# Patient Record
Sex: FEMALE | Race: ASIAN | NOT HISPANIC OR LATINO | Employment: FULL TIME | URBAN - NONMETROPOLITAN AREA
[De-identification: names, ages, dates, MRNs, and addresses within clinical notes are randomized per-mention and may not be internally consistent; named-entity substitution may affect disease eponyms.]

---

## 2017-07-21 ENCOUNTER — HOSPITAL ENCOUNTER (OUTPATIENT)
Dept: FAMILY MEDICINE CLINIC | Facility: CLINIC | Age: 57
Setting detail: SPECIMEN
Discharge: HOME | End: 2017-07-21
Attending: NURSE PRACTITIONER | Admitting: NURSE PRACTITIONER

## 2017-07-21 LAB
T3FREE SERPL-MCNC: 3.35 PG/ML (ref 2.39–6.79)
T4 FREE SERPL-MCNC: 0.92 NG/DL (ref 0.58–1.64)
TSH SERPL-ACNC: 1.54 UIU/ML (ref 0.34–5.6)

## 2017-07-22 ENCOUNTER — HOSPITAL ENCOUNTER (OUTPATIENT)
Dept: ULTRASOUND IMAGING | Facility: HOSPITAL | Age: 57
Discharge: HOME | End: 2017-07-22
Attending: NURSE PRACTITIONER | Admitting: NURSE PRACTITIONER

## 2017-08-07 ENCOUNTER — HOSPITAL ENCOUNTER (OUTPATIENT)
Dept: GENERAL RADIOLOGY | Facility: HOSPITAL | Age: 57
Discharge: HOME | End: 2017-08-07
Attending: INTERNAL MEDICINE | Admitting: INTERNAL MEDICINE

## 2017-08-25 ENCOUNTER — HOSPITAL ENCOUNTER (OUTPATIENT)
Dept: FAMILY MEDICINE CLINIC | Facility: CLINIC | Age: 57
Setting detail: SPECIMEN
Discharge: HOME | End: 2017-08-25
Attending: NURSE PRACTITIONER | Admitting: NURSE PRACTITIONER

## 2017-08-25 LAB
ALBUMIN SERPL-MCNC: 4.3 G/DL (ref 3.5–4.8)
ALBUMIN/GLOB SERPL: 1.1 {RATIO} (ref 1–1.7)
ALP SERPL-CCNC: 77 IU/L (ref 32–91)
ALT SERPL-CCNC: 16 IU/L (ref 14–54)
ANION GAP SERPL CALC-SCNC: 12.6 MMOL/L (ref 10–20)
AST SERPL-CCNC: 21 IU/L (ref 15–41)
BILIRUB SERPL-MCNC: 0.6 MG/DL (ref 0.3–1.2)
BUN SERPL-MCNC: 12 MG/DL (ref 8–20)
BUN/CREAT SERPL: 17.1 (ref 5.4–26.2)
CALCIUM SERPL-MCNC: 9.9 MG/DL (ref 8.9–10.3)
CHLORIDE SERPL-SCNC: 102 MMOL/L (ref 101–111)
CHOLEST SERPL-MCNC: 251 MG/DL
CHOLEST/HDLC SERPL: 5.3 {RATIO}
CONV CO2: 29 MMOL/L (ref 22–32)
CONV LDL CHOLESTEROL DIRECT: 156 MG/DL (ref 0–100)
CONV TOTAL PROTEIN: 8.2 G/DL (ref 6.1–7.9)
CREAT UR-MCNC: 0.7 MG/DL (ref 0.4–1)
FOLATE SERPL-MCNC: >24.8 NG/ML (ref 5.9–24.8)
GLOBULIN UR ELPH-MCNC: 3.9 G/DL (ref 2.5–3.8)
GLUCOSE SERPL-MCNC: 88 MG/DL (ref 65–99)
HDLC SERPL-MCNC: 48 MG/DL
LDLC/HDLC SERPL: 3.3 {RATIO}
LIPID INTERPRETATION: ABNORMAL
MAGNESIUM SERPL-MCNC: 2.3 MG/DL (ref 1.8–2.5)
PHOSPHATE SERPL-MCNC: 4.4 MG/DL (ref 2.4–4.7)
POTASSIUM SERPL-SCNC: 3.6 MMOL/L (ref 3.6–5.1)
SODIUM SERPL-SCNC: 140 MMOL/L (ref 136–144)
TRIGL SERPL-MCNC: 214 MG/DL
VIT B12 SERPL-MCNC: 286 PG/ML (ref 180–914)
VLDLC SERPL CALC-MCNC: 47.4 MG/DL

## 2017-12-06 ENCOUNTER — OFFICE VISIT (OUTPATIENT)
Dept: CARDIOLOGY | Facility: CLINIC | Age: 57
End: 2017-12-06

## 2017-12-06 VITALS
SYSTOLIC BLOOD PRESSURE: 130 MMHG | DIASTOLIC BLOOD PRESSURE: 90 MMHG | HEART RATE: 62 BPM | HEIGHT: 60 IN | WEIGHT: 152 LBS | BODY MASS INDEX: 29.84 KG/M2

## 2017-12-06 DIAGNOSIS — R00.1 BRADYCARDIA: Primary | ICD-10-CM

## 2017-12-06 PROCEDURE — 93000 ELECTROCARDIOGRAM COMPLETE: CPT | Performed by: INTERNAL MEDICINE

## 2017-12-06 PROCEDURE — 99245 OFF/OP CONSLTJ NEW/EST HI 55: CPT | Performed by: INTERNAL MEDICINE

## 2017-12-06 RX ORDER — VITAMIN B COMPLEX
1 CAPSULE ORAL DAILY
COMMUNITY

## 2017-12-06 NOTE — PROGRESS NOTES
Subjective:     Encounter Date:12/06/2017      Patient ID: Tiny Avina is a 57 y.o. female.  Dear Dr. Frias thank you for referring this patient to my office for a second opinion of symptomatic bradycardia and need for a pacemaker.  Chief Complaint:Symptomatic bradycardia.  History of Present Illness    57-year-old female who presents today for a second opinion.  Patient was having episodes of feeling very poorly.  She would get chest tightness and feels very bad.  She would check her pulse and her heart rate would decrease significantly.  She underwent a Zio patch that was placed by a cardiologist in [place].  Her desire patch actually was available to me and she did have some pauses with a ventricular escape rhythm.  It was recommended that she have a pacemaker placement.  She came to my office today for a second opinion.    Review of Systems   Musculoskeletal: Positive for joint pain.   All other systems reviewed and are negative.        ECG 12 Lead  Date/Time: 12/6/2017 10:34 AM  Performed by: VELMA MANCERA  Authorized by: VELMA MANCERA   Comparison: compared with previous ECG from 12/4/2017  Similar to previous ECG  Rhythm: sinus rhythm  Clinical impression: normal ECG               Objective:     Physical Exam   Constitutional: She is oriented to person, place, and time. She appears well-developed.   HENT:   Head: Normocephalic.   Eyes: Conjunctivae are normal.   Neck: Normal range of motion.   Cardiovascular: Normal rate, regular rhythm and normal heart sounds.    Pulmonary/Chest: Breath sounds normal.   Abdominal: Soft. Bowel sounds are normal.   Musculoskeletal: Normal range of motion. She exhibits no edema.   Neurological: She is alert and oriented to person, place, and time.   Skin: Skin is warm and dry.   Psychiatric: She has a normal mood and affect. Her behavior is normal.   Vitals reviewed.      Lab Review:       Assessment:          Diagnosis Plan   1. Bradycardia             Plan:       1.  57-year-old female who presents today for a second opinion of what appears to be symptomatic bradycardia.  I did review the 14 day monitor personally.  I had a very extensive discussion with the patient based on recommendations by another cardiologist that she needed a pacemaker placed.  Interestingly she's never had issues until she had a thyroid biopsy.  It was after the thyroid biopsy that she started having these episodes of feeling her heart slowed down and feeling very bad with it.  She has been worked up extensively I reviewed all those records and could not find anything either that was remarkable.  I told her that based on the information at the time by the other cardiologist it was clear that she was having symptomatic bradycardia and that is a good indication for a pacemaker.  However in the past several weeks her symptoms have abated and she is doing significantly better.  She says she still gets them a little bit but nothing like they were and are only lasting a very brief amount of time.  Although it would be strange it could be from her biopsy of her thyroid that could've activated a vagus nerve causing some of these issues.  Or some other unknown nerve that could've contributed.  At this point since her symptoms are resolving I would follow her clinically and not place a pacemaker.  I told to follow-up in 3 months for reassessment.  I told her if her symptoms worsen or recur or she has a near syncopal episode and she would need a pacemaker placed.  2.  Palpitations she's had these for a long time and haven't really changed much.  The 14 day monitor did show evidence of tachybradycardia syndrome also.  However since her symptoms are improving again will follow clinically and not rush to put a pacemaker in at this point.        Total time spent 80 minutes reviewing records and discussion with 40 minutes face-to-face contact with patient

## 2019-06-26 ENCOUNTER — OFFICE VISIT (OUTPATIENT)
Dept: FAMILY MEDICINE CLINIC | Facility: CLINIC | Age: 59
End: 2019-06-26

## 2019-06-26 VITALS
OXYGEN SATURATION: 98 % | DIASTOLIC BLOOD PRESSURE: 76 MMHG | WEIGHT: 154 LBS | HEART RATE: 52 BPM | BODY MASS INDEX: 30.23 KG/M2 | SYSTOLIC BLOOD PRESSURE: 122 MMHG | HEIGHT: 60 IN | RESPIRATION RATE: 16 BRPM

## 2019-06-26 DIAGNOSIS — Z00.00 LABORATORY EXAM ORDERED AS PART OF ROUTINE GENERAL MEDICAL EXAMINATION: ICD-10-CM

## 2019-06-26 DIAGNOSIS — M54.32 SCIATICA OF LEFT SIDE: Primary | ICD-10-CM

## 2019-06-26 PROCEDURE — 99203 OFFICE O/P NEW LOW 30 MIN: CPT | Performed by: NURSE PRACTITIONER

## 2019-06-26 NOTE — PATIENT INSTRUCTIONS
Sciatica Rehab  Ask your health care provider which exercises are safe for you. Do exercises exactly as told by your health care provider and adjust them as directed. It is normal to feel mild stretching, pulling, tightness, or discomfort as you do these exercises, but you should stop right away if you feel sudden pain or your pain gets worse. Do not begin these exercises until told by your health care provider.  Stretching and range of motion exercises  These exercises warm up your muscles and joints and improve the movement and flexibility of your hips and your back. These exercises also help to relieve pain, numbness, and tingling.  Exercise A: Sciatic nerve glide  1. Sit in a chair with your head facing down toward your chest. Place your hands behind your back. Let your shoulders slump forward.  2. Slowly straighten one of your knees while you tilt your head back as if you are looking toward the ceiling. Only straighten your leg as far as you can without making your symptoms worse.  3. Hold for __________ seconds.  4. Slowly return to the starting position.  5. Repeat with your other leg.  Repeat __________ times. Complete this exercise __________ times a day.  Exercise B: Knee to chest with hip adduction and internal rotation    1. Lie on your back on a firm surface with both legs straight.  2. Bend one of your knees and move it up toward your chest until you feel a gentle stretch in your lower back and buttock. Then, move your knee toward the shoulder that is on the opposite side from your leg.  ? Hold your leg in this position by holding onto the front of your knee.  3. Hold for __________ seconds.  4. Slowly return to the starting position.  5. Repeat with your other leg.  Repeat __________ times. Complete this exercise __________ times a day.  Exercise C: Prone extension on elbows    1. Lie on your abdomen on a firm surface. A bed may be too soft for this exercise.  2. Prop yourself up on your  elbows.  3. Use your arms to help lift your chest up until you feel a gentle stretch in your abdomen and your lower back.  ? This will place some of your body weight on your elbows. If this is uncomfortable, try stacking pillows under your chest.  ? Your hips should stay down, against the surface that you are lying on. Keep your hip and back muscles relaxed.  4. Hold for __________ seconds.  5. Slowly relax your upper body and return to the starting position.  Repeat __________ times. Complete this exercise __________ times a day.  Strengthening exercises  These exercises build strength and endurance in your back. Endurance is the ability to use your muscles for a long time, even after they get tired.  Exercise D: Pelvic tilt  1. Lie on your back on a firm surface. Bend your knees and keep your feet flat.  2. Tense your abdominal muscles. Tip your pelvis up toward the ceiling and flatten your lower back into the floor.  ? To help with this exercise, you may place a small towel under your lower back and try to push your back into the towel.  3. Hold for __________ seconds.  4. Let your muscles relax completely before you repeat this exercise.  Repeat __________ times. Complete this exercise __________ times a day.  Exercise E: Alternating arm and leg raises    1. Get on your hands and knees on a firm surface. If you are on a hard floor, you may want to use padding to cushion your knees, such as an exercise mat.  2. Line up your arms and legs. Your hands should be below your shoulders, and your knees should be below your hips.  3. Lift your left leg behind you. At the same time, raise your right arm and straighten it in front of you.  ? Do not lift your leg higher than your hip.  ? Do not lift your arm higher than your shoulder.  ? Keep your abdominal and back muscles tight.  ? Keep your hips facing the ground.  ? Do not arch your back.  ? Keep your balance carefully, and do not hold your breath.  4. Hold for  __________ seconds.  5. Slowly return to the starting position and repeat with your right leg and your left arm.  Repeat __________ times. Complete this exercise __________ times a day.  Posture and body mechanics    Body mechanics refers to the movements and positions of your body while you do your daily activities. Posture is part of body mechanics. Good posture and healthy body mechanics can help to relieve stress in your body's tissues and joints. Good posture means that your spine is in its natural S-curve position (your spine is neutral), your shoulders are pulled back slightly, and your head is not tipped forward. The following are general guidelines for applying improved posture and body mechanics to your everyday activities.  Standing    · When standing, keep your spine neutral and your feet about hip-width apart. Keep a slight bend in your knees. Your ears, shoulders, and hips should line up.  · When you do a task in which you  one place for a long time, place one foot up on a stable object that is 2-4 inches (5-10 cm) high, such as a footstool. This helps keep your spine neutral.  Sitting    · When sitting, keep your spine neutral and keep your feet flat on the floor. Use a footrest, if necessary, and keep your thighs parallel to the floor. Avoid rounding your shoulders, and avoid tilting your head forward.  · When working at a desk or a computer, keep your desk at a height where your hands are slightly lower than your elbows. Slide your chair under your desk so you are close enough to maintain good posture.  · When working at a computer, place your monitor at a height where you are looking straight ahead and you do not have to tilt your head forward or downward to look at the screen.  Resting    · When lying down and resting, avoid positions that are most painful for you.  · If you have pain with activities such as sitting, bending, stooping, or squatting (flexion-based activities), lie in a  position in which your body does not bend very much. For example, avoid curling up on your side with your arms and knees near your chest (fetal position).  · If you have pain with activities such as standing for a long time or reaching with your arms (extension-based activities), lie with your spine in a neutral position and bend your knees slightly. Try the following positions:  ? Lying on your side with a pillow between your knees.  ? Lying on your back with a pillow under your knees.  Lifting    · When lifting objects, keep your feet at least shoulder-width apart and tighten your abdominal muscles.  · Bend your knees and hips and keep your spine neutral. It is important to lift using the strength of your legs, not your back. Do not lock your knees straight out.  · Always ask for help to lift heavy or awkward objects.  This information is not intended to replace advice given to you by your health care provider. Make sure you discuss any questions you have with your health care provider.  Document Released: 12/18/2006 Document Revised: 08/24/2017 Document Reviewed: 09/02/2016  [employer] Interactive Patient Education © 2019 [employer]

## 2019-06-26 NOTE — PROGRESS NOTES
Subjective   Tiny Avina is a 59 y.o. female.     History of Present Illness   Tiny Avina 59 y.o. female who presents today for a new patient appointment.    she has a history of   Patient Active Problem List   Diagnosis   • Bradycardia   .  she is here to establish care I reviewed the PFSH recorded today by my MA/LPN staff.   she   She has been feeling well except for intermittent sciatica on left side.  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  Pain improves when up walking or lying on right side.  Has tried biofreeze with some improvement.  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          Sees GYN in [place].  Had PAP and mammogram earlier this year and was normal.      Patient had Dexa scan a few years ago and had osteopenia. Takes calcium and vitamin D.        Patient had colonoscopy 4-5 years ago. Scope was normal but to have repeat in 5 years due to father and sister with colorectal cancer.     Has not had labs in a couple of years.  She reports mildly elevated cholesterol.     Has hx of thyroid nodule with negative biopsy.   The following portions of the patient's history were reviewed and updated as appropriate: allergies, current medications, past family history, past medical history, past social history, past surgical history and problem list.    Review of Systems   Constitutional: Negative for unexpected weight change.   Respiratory: Negative for shortness of breath.    Cardiovascular: Negative for chest pain and palpitations.   Musculoskeletal: Positive for back pain. Negative for gait problem.   Psychiatric/Behavioral: Negative for behavioral problems.       Objective   Physical Exam   Constitutional: She is oriented to person, place, and time. She appears well-developed and well-nourished.   Neck: Carotid bruit is not present.   Cardiovascular: Normal rate and regular rhythm.   Pulmonary/Chest: Effort normal and  breath sounds normal.   Neurological: She is alert and oriented to person, place, and time.   Psychiatric: She has a normal mood and affect. Judgment normal.   Nursing note and vitals reviewed.      Assessment/Plan   Tiny was seen today for establish care.    Diagnoses and all orders for this visit:    Sciatica of left side  -     diclofenac (VOLTAREN) 1 % gel gel; Apply 4 g topically to the appropriate area as directed 4 (Four) Times a Day As Needed (pain).    Laboratory exam ordered as part of routine general medical examination  -     Comprehensive metabolic panel  -     Lipid panel  -     CBC and Differential  -     TSH        Labs today as patient is fasting.

## 2019-06-27 LAB
ALBUMIN SERPL-MCNC: 4.2 G/DL (ref 3.5–5.2)
ALBUMIN/GLOB SERPL: 1.3 G/DL
ALP SERPL-CCNC: 77 U/L (ref 39–117)
ALT SERPL-CCNC: 12 U/L (ref 1–33)
AST SERPL-CCNC: 15 U/L (ref 1–32)
BASOPHILS # BLD AUTO: 0.05 10*3/MM3 (ref 0–0.2)
BASOPHILS NFR BLD AUTO: 0.7 % (ref 0–1.5)
BILIRUB SERPL-MCNC: 0.4 MG/DL (ref 0.2–1.2)
BUN SERPL-MCNC: 13 MG/DL (ref 6–20)
BUN/CREAT SERPL: 18.1 (ref 7–25)
CALCIUM SERPL-MCNC: 9.5 MG/DL (ref 8.6–10.5)
CHLORIDE SERPL-SCNC: 103 MMOL/L (ref 98–107)
CHOLEST SERPL-MCNC: 241 MG/DL (ref 0–200)
CO2 SERPL-SCNC: 27.8 MMOL/L (ref 22–29)
CREAT SERPL-MCNC: 0.72 MG/DL (ref 0.57–1)
EOSINOPHIL # BLD AUTO: 0.27 10*3/MM3 (ref 0–0.4)
EOSINOPHIL NFR BLD AUTO: 3.6 % (ref 0.3–6.2)
ERYTHROCYTE [DISTWIDTH] IN BLOOD BY AUTOMATED COUNT: 12.2 % (ref 12.3–15.4)
GLOBULIN SER CALC-MCNC: 3.2 GM/DL
GLUCOSE SERPL-MCNC: 87 MG/DL (ref 65–99)
HCT VFR BLD AUTO: 45.5 % (ref 34–46.6)
HDLC SERPL-MCNC: 50 MG/DL (ref 40–60)
HGB BLD-MCNC: 14 G/DL (ref 12–15.9)
IMM GRANULOCYTES # BLD AUTO: 0 10*3/MM3 (ref 0–0.05)
IMM GRANULOCYTES NFR BLD AUTO: 0 % (ref 0–0.5)
LDLC SERPL CALC-MCNC: 154 MG/DL (ref 0–100)
LYMPHOCYTES # BLD AUTO: 3.28 10*3/MM3 (ref 0.7–3.1)
LYMPHOCYTES NFR BLD AUTO: 43.7 % (ref 19.6–45.3)
MCH RBC QN AUTO: 29.5 PG (ref 26.6–33)
MCHC RBC AUTO-ENTMCNC: 30.8 G/DL (ref 31.5–35.7)
MCV RBC AUTO: 95.8 FL (ref 79–97)
MONOCYTES # BLD AUTO: 0.3 10*3/MM3 (ref 0.1–0.9)
MONOCYTES NFR BLD AUTO: 4 % (ref 5–12)
NEUTROPHILS # BLD AUTO: 3.61 10*3/MM3 (ref 1.7–7)
NEUTROPHILS NFR BLD AUTO: 48 % (ref 42.7–76)
PLATELET # BLD AUTO: 351 10*3/MM3 (ref 140–450)
POTASSIUM SERPL-SCNC: 4.4 MMOL/L (ref 3.5–5.2)
PROT SERPL-MCNC: 7.4 G/DL (ref 6–8.5)
RBC # BLD AUTO: 4.75 10*6/MM3 (ref 3.77–5.28)
SODIUM SERPL-SCNC: 143 MMOL/L (ref 136–145)
TRIGL SERPL-MCNC: 183 MG/DL (ref 0–150)
TSH SERPL DL<=0.005 MIU/L-ACNC: 1.74 MIU/ML (ref 0.27–4.2)
VLDLC SERPL CALC-MCNC: 36.6 MG/DL
WBC # BLD AUTO: 7.51 10*3/MM3 (ref 3.4–10.8)

## 2019-09-23 ENCOUNTER — ON CAMPUS - OUTPATIENT (AMBULATORY)
Dept: URBAN - METROPOLITAN AREA HOSPITAL 2 | Facility: HOSPITAL | Age: 59
End: 2019-09-23
Payer: OTHER GOVERNMENT

## 2019-09-23 VITALS
DIASTOLIC BLOOD PRESSURE: 52 MMHG | HEART RATE: 66 BPM | HEART RATE: 52 BPM | HEIGHT: 60 IN | HEART RATE: 56 BPM | TEMPERATURE: 97 F | DIASTOLIC BLOOD PRESSURE: 61 MMHG | HEART RATE: 64 BPM | DIASTOLIC BLOOD PRESSURE: 69 MMHG | WEIGHT: 153 LBS | DIASTOLIC BLOOD PRESSURE: 82 MMHG | DIASTOLIC BLOOD PRESSURE: 51 MMHG | DIASTOLIC BLOOD PRESSURE: 77 MMHG | HEART RATE: 51 BPM | RESPIRATION RATE: 18 BRPM | SYSTOLIC BLOOD PRESSURE: 118 MMHG | HEART RATE: 54 BPM | DIASTOLIC BLOOD PRESSURE: 70 MMHG | SYSTOLIC BLOOD PRESSURE: 120 MMHG | SYSTOLIC BLOOD PRESSURE: 106 MMHG | DIASTOLIC BLOOD PRESSURE: 66 MMHG | SYSTOLIC BLOOD PRESSURE: 107 MMHG | SYSTOLIC BLOOD PRESSURE: 150 MMHG | RESPIRATION RATE: 16 BRPM | SYSTOLIC BLOOD PRESSURE: 148 MMHG | SYSTOLIC BLOOD PRESSURE: 109 MMHG | OXYGEN SATURATION: 100 % | SYSTOLIC BLOOD PRESSURE: 112 MMHG

## 2019-09-23 DIAGNOSIS — Z80.0 FAMILY HISTORY OF MALIGNANT NEOPLASM OF DIGESTIVE ORGANS: ICD-10-CM

## 2019-09-23 DIAGNOSIS — Z12.11 ENCOUNTER FOR SCREENING FOR MALIGNANT NEOPLASM OF COLON: ICD-10-CM

## 2019-09-23 PROCEDURE — 45378 DIAGNOSTIC COLONOSCOPY: CPT | Performed by: INTERNAL MEDICINE

## 2020-06-24 ENCOUNTER — OFFICE VISIT (OUTPATIENT)
Dept: FAMILY MEDICINE CLINIC | Facility: CLINIC | Age: 60
End: 2020-06-24

## 2020-06-24 VITALS
RESPIRATION RATE: 16 BRPM | TEMPERATURE: 98.2 F | WEIGHT: 155.6 LBS | OXYGEN SATURATION: 97 % | HEART RATE: 55 BPM | BODY MASS INDEX: 30.55 KG/M2 | SYSTOLIC BLOOD PRESSURE: 136 MMHG | DIASTOLIC BLOOD PRESSURE: 78 MMHG | HEIGHT: 60 IN

## 2020-06-24 DIAGNOSIS — E78.2 MIXED HYPERLIPIDEMIA: ICD-10-CM

## 2020-06-24 DIAGNOSIS — E78.5 DYSLIPIDEMIA: ICD-10-CM

## 2020-06-24 DIAGNOSIS — R00.2 PALPITATIONS: ICD-10-CM

## 2020-06-24 DIAGNOSIS — E04.1 THYROID NODULE: ICD-10-CM

## 2020-06-24 DIAGNOSIS — G47.09 OTHER INSOMNIA: ICD-10-CM

## 2020-06-24 DIAGNOSIS — E55.9 VITAMIN D DEFICIENCY: Primary | ICD-10-CM

## 2020-06-24 DIAGNOSIS — Z78.0 POSTMENOPAUSAL: ICD-10-CM

## 2020-06-24 DIAGNOSIS — Z00.00 WELLNESS EXAMINATION: ICD-10-CM

## 2020-06-24 DIAGNOSIS — R73.9 HYPERGLYCEMIA: ICD-10-CM

## 2020-06-24 DIAGNOSIS — Z11.59 NEED FOR HEPATITIS C SCREENING TEST: ICD-10-CM

## 2020-06-24 PROBLEM — M19.90 ARTHRITIS: Status: ACTIVE | Noted: 2020-06-24

## 2020-06-24 PROCEDURE — 99214 OFFICE O/P EST MOD 30 MIN: CPT | Performed by: FAMILY MEDICINE

## 2020-06-24 NOTE — PROGRESS NOTES
Subjective   Tiny Avina is a 60 y.o. female.     History of Present Illness   Tiny comes into the office today to establish care with new PCP. Prior PCP was Sandee Winslow NP who has since moved out of state.  She is here for well exam  No current mediccations.    She states that she would like to have a local primary. She is also requesting fasting lab work, Vitamin D level, as well as any other yearly wellness labs that she may need. Denies depression symptoms. [facility]/ changes.     Review of Systems   Constitutional: Positive for fatigue. Negative for fever.   HENT: Positive for rhinorrhea and sinus pressure. Negative for congestion.    Respiratory: Negative for cough and shortness of breath.    Gastrointestinal: Negative for abdominal distention and abdominal pain.   Musculoskeletal: Positive for back pain.   Psychiatric/Behavioral: Positive for sleep disturbance.       Objective   Physical Exam   Constitutional: She is oriented to person, place, and time. She appears well-developed and well-nourished.   HENT:   Head: Normocephalic and atraumatic.   Right Ear: External ear normal.   Left Ear: External ear normal.   Nose: Nose normal.   Mouth/Throat: Oropharynx is clear and moist.   Eyes: Pupils are equal, round, and reactive to light. Conjunctivae and EOM are normal.   Neck: Normal range of motion. Neck supple.   Cardiovascular: Normal rate, regular rhythm, normal heart sounds and intact distal pulses.   Pulmonary/Chest: Effort normal and breath sounds normal.   Abdominal: Soft. Bowel sounds are normal.   Musculoskeletal: Normal range of motion.   Neurological: She is alert and oriented to person, place, and time.   Skin: Skin is warm and dry. Capillary refill takes less than 2 seconds.   Psychiatric: She has a normal mood and affect.       Assessment/Plan     Fu labs  Schedule mammogram, Dexa  Keep appt with GYN

## 2020-06-26 ENCOUNTER — LAB (OUTPATIENT)
Dept: FAMILY MEDICINE CLINIC | Facility: CLINIC | Age: 60
End: 2020-06-26

## 2020-06-26 DIAGNOSIS — E78.5 DYSLIPIDEMIA: ICD-10-CM

## 2020-06-26 DIAGNOSIS — E78.2 MIXED HYPERLIPIDEMIA: ICD-10-CM

## 2020-06-26 DIAGNOSIS — E55.9 VITAMIN D DEFICIENCY: ICD-10-CM

## 2020-06-26 DIAGNOSIS — R00.2 INTERMITTENT PALPITATIONS: ICD-10-CM

## 2020-06-26 DIAGNOSIS — Z11.59 NEED FOR HEPATITIS C SCREENING TEST: ICD-10-CM

## 2020-06-26 LAB
25(OH)D3 SERPL-MCNC: 34.9 NG/ML (ref 30–100)
ALBUMIN SERPL-MCNC: 4.3 G/DL (ref 3.5–5.2)
ALBUMIN/GLOB SERPL: 1.2 G/DL
ALP SERPL-CCNC: 62 U/L (ref 39–117)
ALT SERPL W P-5'-P-CCNC: 9 U/L (ref 1–33)
ANION GAP SERPL CALCULATED.3IONS-SCNC: 7.2 MMOL/L (ref 5–15)
AST SERPL-CCNC: 16 U/L (ref 1–32)
BASOPHILS # BLD AUTO: 0.07 10*3/MM3 (ref 0–0.2)
BASOPHILS NFR BLD AUTO: 1.1 % (ref 0–1.5)
BILIRUB SERPL-MCNC: 0.3 MG/DL (ref 0.2–1.2)
BUN BLD-MCNC: 13 MG/DL (ref 8–23)
BUN/CREAT SERPL: 14.1 (ref 7–25)
CALCIUM SPEC-SCNC: 9.3 MG/DL (ref 8.6–10.5)
CHLORIDE SERPL-SCNC: 104 MMOL/L (ref 98–107)
CHOLEST SERPL-MCNC: 244 MG/DL (ref 0–200)
CO2 SERPL-SCNC: 27.8 MMOL/L (ref 22–29)
CREAT BLD-MCNC: 0.92 MG/DL (ref 0.57–1)
DEPRECATED RDW RBC AUTO: 38 FL (ref 37–54)
EOSINOPHIL # BLD AUTO: 0.3 10*3/MM3 (ref 0–0.4)
EOSINOPHIL NFR BLD AUTO: 4.6 % (ref 0.3–6.2)
ERYTHROCYTE [DISTWIDTH] IN BLOOD BY AUTOMATED COUNT: 11.8 % (ref 12.3–15.4)
GFR SERPL CREATININE-BSD FRML MDRD: 62 ML/MIN/1.73
GFR SERPL CREATININE-BSD FRML MDRD: 75 ML/MIN/1.73
GLOBULIN UR ELPH-MCNC: 3.6 GM/DL
GLUCOSE BLD-MCNC: 89 MG/DL (ref 65–99)
HBA1C MFR BLD: 5.6 % (ref 3.5–5.6)
HCT VFR BLD AUTO: 42 % (ref 34–46.6)
HCV AB SER DONR QL: NORMAL
HDLC SERPL-MCNC: 46 MG/DL (ref 40–60)
HGB BLD-MCNC: 13.8 G/DL (ref 12–15.9)
IMM GRANULOCYTES # BLD AUTO: 0.02 10*3/MM3 (ref 0–0.05)
IMM GRANULOCYTES NFR BLD AUTO: 0.3 % (ref 0–0.5)
LDLC SERPL CALC-MCNC: 142 MG/DL (ref 0–100)
LDLC/HDLC SERPL: 3.09 {RATIO}
LYMPHOCYTES # BLD AUTO: 2.49 10*3/MM3 (ref 0.7–3.1)
LYMPHOCYTES NFR BLD AUTO: 38.2 % (ref 19.6–45.3)
MCH RBC QN AUTO: 29.2 PG (ref 26.6–33)
MCHC RBC AUTO-ENTMCNC: 32.9 G/DL (ref 31.5–35.7)
MCV RBC AUTO: 89 FL (ref 79–97)
MONOCYTES # BLD AUTO: 0.31 10*3/MM3 (ref 0.1–0.9)
MONOCYTES NFR BLD AUTO: 4.8 % (ref 5–12)
NEUTROPHILS # BLD AUTO: 3.32 10*3/MM3 (ref 1.7–7)
NEUTROPHILS NFR BLD AUTO: 51 % (ref 42.7–76)
NRBC BLD AUTO-RTO: 0 /100 WBC (ref 0–0.2)
PLATELET # BLD AUTO: 354 10*3/MM3 (ref 140–450)
PMV BLD AUTO: 11.1 FL (ref 6–12)
POTASSIUM BLD-SCNC: 4.1 MMOL/L (ref 3.5–5.2)
PROT SERPL-MCNC: 7.9 G/DL (ref 6–8.5)
RBC # BLD AUTO: 4.72 10*6/MM3 (ref 3.77–5.28)
SODIUM BLD-SCNC: 139 MMOL/L (ref 136–145)
T3FREE SERPL-MCNC: 2.9 PG/ML (ref 2–4.4)
T4 FREE SERPL-MCNC: 1.11 NG/DL (ref 0.93–1.7)
TRIGL SERPL-MCNC: 280 MG/DL (ref 0–150)
TSH SERPL DL<=0.05 MIU/L-ACNC: 2.61 UIU/ML (ref 0.27–4.2)
VLDLC SERPL-MCNC: 56 MG/DL
WBC NRBC COR # BLD: 6.51 10*3/MM3 (ref 3.4–10.8)

## 2020-06-26 PROCEDURE — 84481 FREE ASSAY (FT-3): CPT | Performed by: FAMILY MEDICINE

## 2020-06-26 PROCEDURE — 80061 LIPID PANEL: CPT | Performed by: FAMILY MEDICINE

## 2020-06-26 PROCEDURE — 86803 HEPATITIS C AB TEST: CPT | Performed by: FAMILY MEDICINE

## 2020-06-26 PROCEDURE — 36415 COLL VENOUS BLD VENIPUNCTURE: CPT | Performed by: FAMILY MEDICINE

## 2020-06-26 PROCEDURE — 80053 COMPREHEN METABOLIC PANEL: CPT | Performed by: FAMILY MEDICINE

## 2020-06-26 PROCEDURE — 84439 ASSAY OF FREE THYROXINE: CPT | Performed by: FAMILY MEDICINE

## 2020-06-26 PROCEDURE — 83036 HEMOGLOBIN GLYCOSYLATED A1C: CPT | Performed by: FAMILY MEDICINE

## 2020-06-26 PROCEDURE — 84443 ASSAY THYROID STIM HORMONE: CPT | Performed by: FAMILY MEDICINE

## 2020-06-26 PROCEDURE — 85025 COMPLETE CBC W/AUTO DIFF WBC: CPT | Performed by: FAMILY MEDICINE

## 2020-06-26 PROCEDURE — 82306 VITAMIN D 25 HYDROXY: CPT | Performed by: FAMILY MEDICINE

## 2020-07-03 ENCOUNTER — HOSPITAL ENCOUNTER (OUTPATIENT)
Dept: BONE DENSITY | Facility: HOSPITAL | Age: 60
Discharge: HOME | End: 2020-07-03
Admitting: FAMILY MEDICINE

## 2020-07-03 PROCEDURE — 77080 DXA BONE DENSITY AXIAL: CPT

## 2020-07-13 ENCOUNTER — TELEPHONE (OUTPATIENT)
Dept: FAMILY MEDICINE CLINIC | Facility: CLINIC | Age: 60
End: 2020-07-13

## 2020-07-13 NOTE — TELEPHONE ENCOUNTER
----- Message from Payal Walker MD sent at 7/13/2020  6:32 AM EDT -----  dexa shows osteopenia- early bone loss in spine, recommend taking calcium and vit d( caltrate) daily

## 2020-07-13 NOTE — TELEPHONE ENCOUNTER
Called patient. Patient's identity verified. Advised her of DEXA results. She voiced that the Osteopenia is unchanged. She is already taking 2000 IU of Vit D and is unsure of the strength of Calcium that she is taking, but wants to know the strength that you recommend her taking for both.   She also wants to think about taking the Zetia. She will let us know her decision. Please advise. Thank you.

## 2020-07-13 NOTE — TELEPHONE ENCOUNTER
----- Message from Payal Walker MD sent at 7/13/2020  6:29 AM EDT -----  Cholesterol elevated, recommend taking zetia 10 mg daily to lower CV risk

## 2020-07-13 NOTE — TELEPHONE ENCOUNTER
Called pt earlier this day and s/w her re: lab results and DEXA. She is going to think about taking Zetia. She is going to do research on it before she calls us back and gives us an answer.

## 2022-05-04 ENCOUNTER — OFFICE VISIT (OUTPATIENT)
Dept: FAMILY MEDICINE CLINIC | Facility: CLINIC | Age: 62
End: 2022-05-04

## 2022-05-04 VITALS
WEIGHT: 154 LBS | DIASTOLIC BLOOD PRESSURE: 72 MMHG | HEART RATE: 61 BPM | OXYGEN SATURATION: 99 % | HEIGHT: 60 IN | SYSTOLIC BLOOD PRESSURE: 119 MMHG | BODY MASS INDEX: 30.23 KG/M2 | RESPIRATION RATE: 18 BRPM | TEMPERATURE: 97.3 F

## 2022-05-04 DIAGNOSIS — M53.3 SACRAL BACK PAIN: ICD-10-CM

## 2022-05-04 DIAGNOSIS — M54.50 CHRONIC LOW BACK PAIN, UNSPECIFIED BACK PAIN LATERALITY, UNSPECIFIED WHETHER SCIATICA PRESENT: ICD-10-CM

## 2022-05-04 DIAGNOSIS — M54.2 NECK PAIN: Primary | ICD-10-CM

## 2022-05-04 DIAGNOSIS — E78.2 MIXED HYPERLIPIDEMIA: ICD-10-CM

## 2022-05-04 DIAGNOSIS — Z12.11 SCREENING FOR COLON CANCER: ICD-10-CM

## 2022-05-04 DIAGNOSIS — G89.29 CHRONIC THORACIC BACK PAIN, UNSPECIFIED BACK PAIN LATERALITY: ICD-10-CM

## 2022-05-04 DIAGNOSIS — G89.29 CHRONIC LOW BACK PAIN, UNSPECIFIED BACK PAIN LATERALITY, UNSPECIFIED WHETHER SCIATICA PRESENT: ICD-10-CM

## 2022-05-04 DIAGNOSIS — M54.6 CHRONIC THORACIC BACK PAIN, UNSPECIFIED BACK PAIN LATERALITY: ICD-10-CM

## 2022-05-04 PROCEDURE — 99205 OFFICE O/P NEW HI 60 MIN: CPT | Performed by: REGISTERED NURSE

## 2022-05-04 NOTE — PROGRESS NOTES
Chief Complaint  Chest Pain (Doing better now, comes in episodes ) and Hospital Follow Up Visit    Subjective    History of Present Illness {CC  Problem List  Visit  Diagnosis   Encounters  Notes  Medications  Labs  Result Review Imaging  Media :23}     Tiny Avina presents to [facility] for Chest Pain (Doing better now, comes in episodes ) and Hospital Follow Up Visit.    Patient is a 62-year-old female who presents to the clinic today with concerns of establishing care and intermittent chronic chest pain with acute exacerbation of chest pain x2 weeks.  Patient shares that this chest pain episode has improved significantly.  She shares that during the episode on April 29th, her blood pressure had been elevated to 180/70s. She decided to go to the ER at [facility] to be evaluated.  She shares that her EKG was normal at that visit they checked her troponins which were normal and all other labs and values came back normal except she was diagnosed with a urinary tract infection.  They started her on antibiotics and since starting antibiotics she reports that she has been improving and feeling better.  Patient denies any current chest pain at today's visit.  Patient denies any skipping heart beats. Some bradycardia at home (heart rate in 40's).  Patient shares that she has a cardiology visit scheduled within the next couple weeks.  Patient shares that she will be keeping this appointment and will be evaluated by cardiology.  Patient shares that her current chest pains do not feel like her chest pain did in 2017.    Patient shares that she has experienced neck pain and back pain that has progressed over the last 20 years.  Patient would like xray orders.  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 She has a history of spinal pain due to this.  She does feel that her pain has worsened over the last several years.  Patient does not want any  "other treatment regarding this today but would like updated x-rays to know the progression of her condition.         Review of Systems   Constitutional: Negative.  Negative for activity change, chills, fatigue and fever.   HENT: Negative.  Negative for congestion, dental problem, ear pain, hearing loss, rhinorrhea, sinus pain, sore throat, tinnitus and trouble swallowing.    Eyes: Negative.  Negative for pain and visual disturbance.   Respiratory: Negative.  Negative for cough, chest tightness, shortness of breath and wheezing.    Cardiovascular: Positive for chest pain. Negative for palpitations and leg swelling.   Gastrointestinal: Negative.  Negative for abdominal pain, diarrhea, nausea and vomiting.   Endocrine: Negative.  Negative for polydipsia, polyphagia and polyuria.   Genitourinary: Negative.  Negative for difficulty urinating, dysuria, frequency and urgency.   Musculoskeletal: Positive for back pain and neck pain. Negative for arthralgias, gait problem and myalgias.   Skin: Negative.  Negative for color change, pallor, rash and wound.   Allergic/Immunologic: Negative.  Negative for environmental allergies.   Neurological: Negative.  Negative for dizziness, speech difficulty, weakness, light-headedness, numbness and headaches.   Hematological: Negative.    Psychiatric/Behavioral: Negative.  Negative for confusion, decreased concentration, self-injury and suicidal ideas. The patient is not nervous/anxious.    All other systems reviewed and are negative.       Objective     Vital Signs:   /72   Pulse 61   Temp 97.3 °F (36.3 °C)   Resp 18   Ht 152.4 cm (60\")   Wt 69.9 kg (154 lb)   SpO2 99%   BMI 30.08 kg/m²     Past Medical History:   Diagnosis Date   • Chest pain    • Diverticulosis    • Enlarged thyroid    • H/O varicose veins     phlebitis   • Hyperlipidemia    • Insomnia    • Irregular heart beat    • Palpitations    • Scoliosis    • Vitamin D deficiency       Past Surgical History: "   Procedure Laterality Date   • COLONOSCOPY     • CYST REMOVAL Left 2011    breast  //  benign      Family History   Problem Relation Age of Onset   • Thyroid disease Mother         goiter, removed   • Hypertension Mother    • Cancer Father         bladder   • Thyroid disease Sister    • Liver cancer Sister    • Thyroid disease Other         goiter, removed   • Diabetes Maternal Uncle    • Sudden death Maternal Uncle    • Cancer Sister       No visits with results within 6 Month(s) from this visit.   Latest known visit with results is:   Lab on 06/26/2020   Component Date Value Ref Range Status   • Total Cholesterol 06/26/2020 244 (A) 0 - 200 mg/dL Final   • Triglycerides 06/26/2020 280 (A) 0 - 150 mg/dL Final   • HDL Cholesterol 06/26/2020 46  40 - 60 mg/dL Final   • LDL Cholesterol  06/26/2020 142 (A) 0 - 100 mg/dL Final   • VLDL Cholesterol 06/26/2020 56  mg/dL Final   • LDL/HDL Ratio 06/26/2020 3.09   Final   • 25 Hydroxy, Vitamin D 06/26/2020 34.9  30.0 - 100.0 ng/ml Final   • Hepatitis C Ab 06/26/2020 Non-Reactive  Non-Reactive Final       Physical Exam  Vitals and nursing note reviewed.   Constitutional:       Appearance: Normal appearance. She is normal weight.   HENT:      Head: Normocephalic and atraumatic.   Cardiovascular:      Rate and Rhythm: Regular rhythm. Bradycardia present.      Pulses: Normal pulses. No decreased pulses.      Heart sounds: Normal heart sounds, S1 normal and S2 normal. Heart sounds not distant. No murmur heard.   No systolic murmur is present.   No diastolic murmur is present.    No friction rub. No gallop. No S3 or S4 sounds.   Pulmonary:      Effort: Pulmonary effort is normal. No respiratory distress.      Breath sounds: Normal breath sounds. No stridor. No wheezing, rhonchi or rales.   Chest:      Chest wall: No tenderness.   Abdominal:      General: Abdomen is flat. Bowel sounds are normal. There is no distension.      Palpations: Abdomen is soft. There is no mass.       Tenderness: There is no abdominal tenderness. There is no right CVA tenderness, left CVA tenderness, guarding or rebound.      Hernia: No hernia is present.   Musculoskeletal:      Cervical back: Tenderness present. Decreased range of motion.      Thoracic back: Tenderness present. Decreased range of motion.      Right lower leg: No edema.      Left lower leg: No edema.   Skin:     General: Skin is warm and dry.      Capillary Refill: Capillary refill takes less than 2 seconds.      Coloration: Skin is not jaundiced or pale.   Neurological:      General: No focal deficit present.      Mental Status: She is alert and oriented to person, place, and time. Mental status is at baseline.      Motor: No weakness.      Coordination: Coordination normal.      Gait: Gait normal.   Psychiatric:         Mood and Affect: Mood normal.         Behavior: Behavior normal.         Thought Content: Thought content normal.         Judgment: Judgment normal.          Result Review  Data Reviewed:{ Labs  Result Review  Imaging  Med Tab  Media :23}   I reviewed the patient's results from her recent hospital visit to the ER at [facility] on April 29, 2022.  I reviewed previous notes for this patient, previous labs, previous imaging, and any current referrals.       Assessment and Plan {CC Problem List  Visit Diagnosis  ROS  Review (Popup)  [facility] Maintenance  Quality  BestPractice  Medications  SmartSets  SnapShot Encounters  Media :23}   Diagnoses and all orders for this visit:    1. Neck pain (Primary)  -     XR Spine Cervical Complete 4 or 5 View; Future    2. Chronic thoracic back pain, unspecified back pain laterality  -     XR Spine Thoracic 2 View; Future    3. Chronic low back pain, unspecified back pain laterality, unspecified whether sciatica present  -     XR Spine Lumbar 2 or 3 View; Future    4. Sacral back pain  -     XR Sacroiliac Joints 3+ View; Future    5. Screening for colon cancer  -     Cologuard -  Stool, Per Rectum; Future    6. Mixed hyperlipidemia  -     Lipid panel; Future    -As requested by patient, x-rays have been ordered of the thoracic, cervical, sacral, and lumbar region.  These are due to her self-reported congenital abnormality where her L5 had fused to her SI she shares.  -Yearly Cologuard as been ordered at patient request.  -Lipid panel ordered and patient intends to complete within the next week.  Results will be called the patient.  -Patient declined need for orthopedic referral but would like to start with x-rays.  -At next visit lets discuss need for Pap smear annual physical COVID-vaccine zoster vaccine and any other care gaps that may be outstanding.    I spent 60 minutes caring for Tiny on this date of service. This time includes time spent by me in the following activities:preparing for the visit, reviewing tests, obtaining and/or reviewing a separately obtained history, performing a medically appropriate examination and/or evaluation , counseling and educating the patient/family/caregiver, ordering medications, tests, or procedures, referring and communicating with other health care professionals , documenting information in the medical record, independently interpreting results and communicating that information with the patient/family/caregiver and care coordination  Follow Up {Instructions Charge Capture  Follow-up Communications :23}     Patient was given instructions and counseling regarding her condition or for health maintenance advice. Please see specific information pulled into the AVS (placed there by myself) if appropriate.    Return in about 3 months (around 8/4/2022) for Please see cardiology at upcoming scheduled visit. Recheck chest pain after visit with cardiology..    MDM  Number of Diagnoses or Management Options  Chronic low back pain, unspecified back pain laterality, unspecified whether sciatica present: established, worsening  Chronic thoracic back pain,  unspecified back pain laterality: established, worsening  Mixed hyperlipidemia: established, improving  Neck pain: established, worsening  Sacral back pain: established, worsening  Screening for colon cancer: minor     Amount and/or Complexity of Data Reviewed  Clinical lab tests: ordered and reviewed  Tests in the radiology section of CPT®: ordered and reviewed  Tests in the medicine section of CPT®: ordered  Discussion of test results with the performing providers: no  Decide to obtain previous medical records or to obtain history from someone other than the patient: yes  Obtain history from someone other than the patient: no  Review and summarize past medical records: yes  Discuss the patient with other providers: no  Independent visualization of images, tracings, or specimens: no    Risk of Complications, Morbidity, and/or Mortality  Presenting problems: high  Diagnostic procedures: low  Management options: high    Critical Care  Total time providing critical care: 30-74 minutes    Patient Progress  Patient progress: stable       ANTHONY Baird, FNP-BC

## 2022-05-09 ENCOUNTER — CLINICAL SUPPORT (OUTPATIENT)
Dept: FAMILY MEDICINE CLINIC | Facility: CLINIC | Age: 62
End: 2022-05-09

## 2022-05-09 DIAGNOSIS — E78.2 MIXED HYPERLIPIDEMIA: ICD-10-CM

## 2022-05-09 LAB
CHOLEST SERPL-MCNC: 220 MG/DL (ref 0–200)
HDLC SERPL-MCNC: 45 MG/DL (ref 40–60)
LDLC SERPL CALC-MCNC: 146 MG/DL (ref 0–100)
LDLC/HDLC SERPL: 3.17 {RATIO}
TRIGL SERPL-MCNC: 162 MG/DL (ref 0–150)
VLDLC SERPL-MCNC: 29 MG/DL (ref 5–40)

## 2022-05-09 PROCEDURE — 80061 LIPID PANEL: CPT | Performed by: REGISTERED NURSE

## 2022-05-09 PROCEDURE — 36415 COLL VENOUS BLD VENIPUNCTURE: CPT | Performed by: REGISTERED NURSE

## 2022-05-09 NOTE — PROGRESS NOTES
Venipuncture Blood Specimen Collection  Venipuncture performed in (L) ARM VIA BUTTERFLY by Cleo Ma LPN WITH ASSISTANCE FROM ANTHONY COLLADO with good hemostasis. Patient tolerated the procedure well without complications.   05/09/22   Cleo Ma LPN

## 2022-05-11 NOTE — PROGRESS NOTES
Please inform patient that:    Total cholesterol has improved and is down to 220 with a goal of 200 or below.    Triglycerides have significantly improved, we are at 162 and need to be at 150 or below.    LDL, or bad cholesterol, is about the same it is 146 with a goal of 100 or below.    At this time I would only recommend continuing to diet of restricted low-fat foods.  We can reevaluate her lipids at a future encounter.  Thank you

## 2022-05-25 DIAGNOSIS — G89.29 CHRONIC THORACIC BACK PAIN, UNSPECIFIED BACK PAIN LATERALITY: ICD-10-CM

## 2022-05-25 DIAGNOSIS — M53.3 SACRAL BACK PAIN: ICD-10-CM

## 2022-05-25 DIAGNOSIS — M54.2 NECK PAIN: ICD-10-CM

## 2022-05-25 DIAGNOSIS — M54.50 CHRONIC LOW BACK PAIN, UNSPECIFIED BACK PAIN LATERALITY, UNSPECIFIED WHETHER SCIATICA PRESENT: ICD-10-CM

## 2022-05-25 DIAGNOSIS — G89.29 CHRONIC LOW BACK PAIN, UNSPECIFIED BACK PAIN LATERALITY, UNSPECIFIED WHETHER SCIATICA PRESENT: ICD-10-CM

## 2022-05-25 DIAGNOSIS — M54.6 CHRONIC THORACIC BACK PAIN, UNSPECIFIED BACK PAIN LATERALITY: ICD-10-CM

## 2022-05-26 NOTE — PROGRESS NOTES
Can you please inform the patient of the following results:    This is x-ray 1 of 4.    Her sacroiliac joints do not show any fractures showed just mild degeneration which is normal.

## 2022-05-26 NOTE — PROGRESS NOTES
X-ray 4/4.    On the spinal thoracic x-ray, just comments again that it sees the spondylotic changes and facet arthrosis.  No fractures or any other abnormality seen.

## 2022-05-26 NOTE — PROGRESS NOTES
X-ray 3 of 4.    X-ray states that there is spondylotic changes and facet arthrosis with mild dextroscoliotic curvature.  Meaning that her spine is curving.  Again if patient is having pain and discomfort we can send her for further evaluation to spinal surgeon.

## 2022-05-26 NOTE — PROGRESS NOTES
X-ray 2/4.    Regarding her spine cervical x-ray, the seen some moderate foraminal narrowing of the left C6/C7 vertebrae.  If this is causing the patient pain in her neck we can send her to a spinal surgeon for further investigation.

## 2022-07-08 ENCOUNTER — TELEPHONE (OUTPATIENT)
Dept: FAMILY MEDICINE CLINIC | Facility: CLINIC | Age: 62
End: 2022-07-08

## 2022-07-08 DIAGNOSIS — R05.9 COUGH: ICD-10-CM

## 2022-07-08 DIAGNOSIS — U07.1 ACUTE COVID-19: Primary | ICD-10-CM

## 2022-07-08 RX ORDER — DEXTROMETHORPHAN HYDROBROMIDE AND PROMETHAZINE HYDROCHLORIDE 15; 6.25 MG/5ML; MG/5ML
5 SYRUP ORAL 4 TIMES DAILY PRN
Qty: 180 ML | Refills: 0 | Status: SHIPPED | OUTPATIENT
Start: 2022-07-08 | End: 2022-11-28

## 2022-07-08 RX ORDER — BENZONATATE 100 MG/1
100 CAPSULE ORAL 3 TIMES DAILY PRN
Qty: 30 CAPSULE | Refills: 1 | Status: SHIPPED | OUTPATIENT
Start: 2022-07-08 | End: 2022-11-28

## 2022-07-08 NOTE — TELEPHONE ENCOUNTER
Hub is instructed to read the documentation below to patient     1 hour ago (3:26 PM)     CT    Can you please inform the patient that have sent her over some Tessalon Perles and Promethazine DM.  Tessalon Perles she can take anytime during the day that will help with her cough.  The Promethazine DM will help with her cough as well but it is a drowsy medication and will likely cause her to sleep.  This will help a lot at nighttime so she can get better rest and heal quicker.  If she has any other concerns give us a call and we will be happy to help.     Thank you.    Routing comment

## 2022-07-08 NOTE — TELEPHONE ENCOUNTER
Caller: Tiny Avina    Relationship to patient: Self    Best call back number:[phone] (H)     Date of exposure: UNKNOWN     Date of positive COVID19 test: June 28 TH     COVID19 symptoms: LINGERING COUGH, DIZZY, HEADACHE, FEVER OFF AND ON     Additional information or concerns: PATIENT IS ASKING FOR SOMETHING TO HELP HER RECOVER FROM THE LAST OF HER COVID SYMPTOMS     What is the patients preferred pharmacy: [id number], [place] - [address] AT [facility] & [address] - [phone]  - [phone]   [phone]

## 2022-10-21 ENCOUNTER — TELEPHONE (OUTPATIENT)
Dept: FAMILY MEDICINE CLINIC | Facility: CLINIC | Age: 62
End: 2022-10-21

## 2022-10-21 DIAGNOSIS — Z12.11 SCREENING FOR MALIGNANT NEOPLASM OF COLON: Primary | ICD-10-CM

## 2022-10-21 DIAGNOSIS — R06.83 SNORING: ICD-10-CM

## 2022-10-21 NOTE — TELEPHONE ENCOUNTER
Caller: Tiny Avina    Relationship: Self    Best call back number: [phone]    What orders are you requesting (i.e. lab or imaging): SLEEP STUDY, COLON CHECK ( NOT A COLONOSCOPY), STOOL TEST TO TEST FOR COLON CANCER    In what timeframe would the patient need to come in: AS SOON AS POSSIBLE    Where will you receive your lab/imaging services: [facility]    Additional notes: PATIENT CALLED TO REQUEST AN ORDER BE PLACED FOR A SLEEP STUDY.    PATIENT ALSO STATED THAT SHE IS NOT HAPPY ABOUT THE LACK OF RESPONSIVENESS FROM THE CLINIC.  SHE STATED  THAT SHE HAS CALLED A COUPLE OF TIMES AND HAS NOT RECEIVED A RESPONSE.  THIS HAPPENED IN July WHEN SHE WAS FIGHTING COVID SYMPTOMS.    PATIENT REQUESTED TO SPEAK DIRECTLY TO BENJAMIN PÉREZ WHEN SHE CALLED.

## 2022-11-28 ENCOUNTER — OFFICE VISIT (OUTPATIENT)
Dept: CARDIOLOGY | Facility: CLINIC | Age: 62
End: 2022-11-28

## 2022-11-28 VITALS
OXYGEN SATURATION: 99 % | BODY MASS INDEX: 28.86 KG/M2 | HEIGHT: 60 IN | HEART RATE: 55 BPM | DIASTOLIC BLOOD PRESSURE: 80 MMHG | WEIGHT: 147 LBS | SYSTOLIC BLOOD PRESSURE: 130 MMHG | RESPIRATION RATE: 18 BRPM

## 2022-11-28 DIAGNOSIS — E78.49 OTHER HYPERLIPIDEMIA: ICD-10-CM

## 2022-11-28 DIAGNOSIS — I10 ESSENTIAL HYPERTENSION: ICD-10-CM

## 2022-11-28 DIAGNOSIS — R00.1 BRADYCARDIA: ICD-10-CM

## 2022-11-28 DIAGNOSIS — E04.1 THYROID NODULE: ICD-10-CM

## 2022-11-28 DIAGNOSIS — R00.2 INTERMITTENT PALPITATIONS: ICD-10-CM

## 2022-11-28 DIAGNOSIS — R07.89 CHEST PAIN, ATYPICAL: ICD-10-CM

## 2022-11-28 PROCEDURE — 93000 ELECTROCARDIOGRAM COMPLETE: CPT | Performed by: INTERNAL MEDICINE

## 2022-11-28 PROCEDURE — 99203 OFFICE O/P NEW LOW 30 MIN: CPT | Performed by: INTERNAL MEDICINE

## 2022-11-28 RX ORDER — MELATONIN
1000 DAILY
COMMUNITY

## 2022-11-28 RX ORDER — GLUCOSA SU 2KCL/CHONDROITIN SU 500-400 MG
CAPSULE ORAL
COMMUNITY
Start: 2018-01-01

## 2022-11-28 RX ORDER — HYDRALAZINE HYDROCHLORIDE 10 MG/1
10 TABLET, FILM COATED ORAL 3 TIMES DAILY
Qty: 30 TABLET | Refills: 0 | Status: SHIPPED | OUTPATIENT
Start: 2022-11-28 | End: 2023-01-10 | Stop reason: SDUPTHER

## 2022-11-28 NOTE — PROGRESS NOTES
Cardiology Office Visit      Encounter Date:  11/28/2022    Patient ID:   Tiny Avina is a 62 y.o. female.    Reason For Followup:  Hypertension and intermittent accelerated hypertension  Palpitations and flushing      Brief Clinical History:  Dear Jakob Boyce APRN    I had the pleasure of seeing Tiny Avina today. As you are well aware, this is a 62 y.o. female past medical history that is significant for history of thyroid nodule prior history of bradycardia intermittent palpitations and prior diagnosis of mitral valve prolapse here for follow-up for further evaluation and treatment options for fluctuating blood pressure    Interval History:  Intermittent episodes of accelerated blood pressure but in between patient is normotensive  Multiple ER visits  Patient having episodes where her blood pressure jumps up to 180-200 with symptoms  Rest of the time patient is normotensive  Intermittent palpitations and dizziness  No syncope  Prior evaluation work-up for bradycardia    Assessment & Plan    Impressions:  Intermittent accelerated hypertension  Palpitations  Fatigue and dizziness  Chest discomfort  Thyroid nodule  Prior history of mitral valve prolapse  Hyperlipidemia    Recommendations:  Patient was given hydralazine 10 mg to use as needed for elevated blood pressure  Recent labs and work-up and evaluation in the emergency room reviewed and discussed with patient  Echocardiogram to assess LV systolic function rule out any structural heart disease rule out any significant valve pathology  Exercise stress test to further stratify patient for symptoms of chest discomfort and also watch the heart rate and blood pressure response with exercise  Home blood pressure monitoring  Work-up for secondary hypertension including renal artery ultrasound I will also metanephrine work-up  Prior work-up and evaluation reviewed and discussed with patient  Consider extended Holter monitor  Follow-up in office  "in 1 month  Objective:    Vitals:  Vitals:    11/28/22 0911   BP: 130/80   BP Location: Left arm   Patient Position: Sitting   Cuff Size: Large Adult   Pulse: 55   Resp: 18   SpO2: 99%   Weight: 66.7 kg (147 lb)   Height: 152.4 cm (60\")       Physical Exam:    General: Alert, cooperative, no distress, appears stated age  Head:  Normocephalic, atraumatic, mucous membranes moist  Eyes:  Conjunctiva/corneas clear, EOM's intact     Neck:  Supple,  no adenopathy;      Lungs: Clear to auscultation bilaterally, no wheezes rhonchi rales are noted  Chest wall: No tenderness  Heart::  Regular rate and rhythm, S1 and S2 normal, no murmur, rub or gallop  Abdomen: Soft, non-tender, nondistended bowel sounds active  Extremities: No cyanosis, clubbing, or edema  Pulses: 2+ and symmetric all extremities  Skin:  No rashes or lesions  Neuro/psych: A&O x3. CN II through XII are grossly intact with appropriate affect      Allergies:  Allergies   Allergen Reactions   • Asa [Aspirin] Other (See Comments)     Stomach pains       Medication Review:     Current Outpatient Medications:   •  ascorbic acid (VITAMIN C) 1000 MG tablet, Take 1,000 mg by mouth Daily., Disp: , Rfl:   •  B Complex Vitamins (VITAMIN B COMPLEX) capsule capsule, Take 1 capsule by mouth Daily., Disp: , Rfl:   •  Calcium-Magnesium-Zinc 167-83-8 MG tablet, Take 1 tablet by mouth Daily., Disp: , Rfl:   •  cholecalciferol (VITAMIN D3) 25 MCG (1000 UT) tablet, Take 1,000 Units by mouth Daily., Disp: , Rfl:   •  Coenzyme Q10 (Co Q10) 100 MG capsule, , Disp: , Rfl:   •  Garlic 1000 MG capsule, Take 1 capsule by mouth Daily., Disp: , Rfl:   •  Melatonin 5 MG capsule, Take 1 each by mouth 2 (two) times a day., Disp: 100 each, Rfl:   •  Omega 3-6-9 Fatty Acids (TRIPLE OMEGA-3-6-9 PO), Take 1 capsule by mouth Daily., Disp: , Rfl:   •  vitamin A 05464 UNIT capsule, Take 10,000 Units by mouth Daily., Disp: , Rfl:   •  hydrALAZINE (APRESOLINE) 10 MG tablet, Take 1 tablet by mouth 3 " (Three) Times a Day., Disp: 30 tablet, Rfl: 0    Family History:  Family History   Problem Relation Age of Onset   • Thyroid disease Mother         goiter, removed   • Hypertension Mother    • Heart failure Mother         Heart failed   • Cancer Father         bladder   • Thyroid disease Sister    • Liver cancer Sister    • Thyroid disease Other         goiter, removed   • Diabetes Maternal Uncle    • Sudden death Maternal Uncle    • Cancer Sister        Past Medical History:  Past Medical History:   Diagnosis Date   • Arrhythmia    • Chest pain    • Diverticulosis    • Enlarged thyroid    • H/O varicose veins     phlebitis   • Heart murmur    • Hyperlipidemia    • Hypertension 2021   • Insomnia    • Irregular heart beat    • Mitral valve prolapse    • Palpitations    • Scoliosis    • Vitamin D deficiency        Past surgical History:  Past Surgical History:   Procedure Laterality Date   • COLONOSCOPY     • CYST REMOVAL Left 2011    breast  //  benign       Social History:  Social History     Socioeconomic History   • Marital status:    Tobacco Use   • Smoking status: Never   • Smokeless tobacco: Never   • Tobacco comments:     never a smoker   Vaping Use   • Vaping Use: Never used   Substance and Sexual Activity   • Alcohol use: No     Comment: caffeine use   • Drug use: No   • Sexual activity: Not Currently       Review of Systems:  The following systems were reviewed as they relate to the cardiovascular system: Constitutional, Eyes, ENT, Cardiovascular, Respiratory, Gastrointestinal, Integumentary, Neurological, Psychiatric, Hematologic, Endocrine, Musculoskeletal, and Genitourinary. The pertinent cardiovascular findings are reported above with all other cardiovascular points within those systems being negative.    Diagnostic Study Review:     Current Electrocardiogram:    ECG 12 Lead    Date/Time: 11/28/2022 10:33 AM  Performed by: Nimco Ayala MD  Authorized by: Nimco Ayala MD    Comparison: compared with previous ECG   Similar to previous ECG  Rhythm: sinus rhythm and sinus bradycardia  Rate: normal  BPM: 55  Conduction: conduction normal  ST Segments: ST segments normal  T Waves: T waves normal  QRS axis: normal    Clinical impression: normal ECG              NOTE: The following portions of the patient's history were reviewed and updated this visit as appropriate: allergies, current medications, past family history, past medical history, past social history, past surgical history and problem list.

## 2022-12-02 ENCOUNTER — PATIENT ROUNDING (BHMG ONLY) (OUTPATIENT)
Dept: CARDIOLOGY | Facility: CLINIC | Age: 62
End: 2022-12-02

## 2022-12-02 NOTE — PROGRESS NOTES
December 2, 2022    Hello, may I speak with Tiny Avina?    My name is Jaye    I am  with [facility] [address] [place] [address].    Before we get started may I verify your date of birth? 1960    I am calling to officially welcome you to our practice and ask about your recent visit. Is this a good time to talk? Yes    Tell me about your visit with us. What things went well?  Dr. Ayala was very nice.  Very thorough.  Enjoyed my visit.          We're always looking for ways to make our patients' experiences even better. Do you have recommendations on ways we may improve?   No     Overall were you satisfied with your first visit to our practice?  Yes     I appreciate you taking the time to speak with me today. Is there anything else I can do for you?  No    Thank you, and have a great day.

## 2022-12-06 ENCOUNTER — HOSPITAL ENCOUNTER (OUTPATIENT)
Dept: CARDIOLOGY | Facility: HOSPITAL | Age: 62
Discharge: HOME | End: 2022-12-06

## 2022-12-06 VITALS
WEIGHT: 147 LBS | HEIGHT: 60 IN | DIASTOLIC BLOOD PRESSURE: 85 MMHG | HEART RATE: 75 BPM | SYSTOLIC BLOOD PRESSURE: 137 MMHG | BODY MASS INDEX: 28.86 KG/M2

## 2022-12-06 DIAGNOSIS — R00.1 BRADYCARDIA: ICD-10-CM

## 2022-12-06 DIAGNOSIS — I10 ESSENTIAL HYPERTENSION: ICD-10-CM

## 2022-12-06 DIAGNOSIS — R00.2 INTERMITTENT PALPITATIONS: ICD-10-CM

## 2022-12-06 DIAGNOSIS — E78.49 OTHER HYPERLIPIDEMIA: ICD-10-CM

## 2022-12-06 DIAGNOSIS — R07.89 CHEST PAIN, ATYPICAL: ICD-10-CM

## 2022-12-06 PROCEDURE — 93306 TTE W/DOPPLER COMPLETE: CPT | Performed by: INTERNAL MEDICINE

## 2022-12-06 PROCEDURE — 93016 CV STRESS TEST SUPVJ ONLY: CPT | Performed by: INTERNAL MEDICINE

## 2022-12-06 PROCEDURE — 93017 CV STRESS TEST TRACING ONLY: CPT

## 2022-12-06 PROCEDURE — 93306 TTE W/DOPPLER COMPLETE: CPT

## 2022-12-06 PROCEDURE — 93018 CV STRESS TEST I&R ONLY: CPT | Performed by: INTERNAL MEDICINE

## 2022-12-07 LAB
BH CV STRESS BP STAGE 1: NORMAL
BH CV STRESS BP STAGE 2: NORMAL
BH CV STRESS BP STAGE 3: NORMAL
BH CV STRESS DURATION MIN STAGE 1: 3
BH CV STRESS DURATION MIN STAGE 2: 3
BH CV STRESS DURATION MIN STAGE 3: 1
BH CV STRESS DURATION SEC STAGE 1: 0
BH CV STRESS DURATION SEC STAGE 2: 0
BH CV STRESS DURATION SEC STAGE 3: 1
BH CV STRESS GRADE STAGE 1: 10
BH CV STRESS GRADE STAGE 2: 12
BH CV STRESS GRADE STAGE 3: 14
BH CV STRESS HR STAGE 1: 98
BH CV STRESS HR STAGE 2: 139
BH CV STRESS HR STAGE 3: 160
BH CV STRESS METS STAGE 1: 5
BH CV STRESS METS STAGE 2: 7.5
BH CV STRESS METS STAGE 3: 10.1
BH CV STRESS PROTOCOL 1: NORMAL
BH CV STRESS RECOVERY BP: NORMAL MMHG
BH CV STRESS RECOVERY HR: 84 BPM
BH CV STRESS SPEED STAGE 1: 1.7
BH CV STRESS SPEED STAGE 2: 2.5
BH CV STRESS SPEED STAGE 3: 3.4
BH CV STRESS STAGE 1: 1
BH CV STRESS STAGE 2: 2
BH CV STRESS STAGE 3: 3
MAXIMAL PREDICTED HEART RATE: 158 BPM
PERCENT MAX PREDICTED HR: 101.27 %
STRESS BASELINE BP: NORMAL MMHG
STRESS BASELINE HR: 67 BPM
STRESS PERCENT HR: 119 %
STRESS POST ESTIMATED WORKLOAD: 10.1 METS
STRESS POST EXERCISE DUR MIN: 7 MIN
STRESS POST EXERCISE DUR SEC: 0 SEC
STRESS POST PEAK BP: NORMAL MMHG
STRESS POST PEAK HR: 160 BPM
STRESS TARGET HR: 134 BPM

## 2022-12-08 LAB
BH CV ECHO MEAS - ACS: 2.17 CM
BH CV ECHO MEAS - AI P1/2T: 542.9 MSEC
BH CV ECHO MEAS - AO MAX PG: 5.8 MMHG
BH CV ECHO MEAS - AO MEAN PG: 3.3 MMHG
BH CV ECHO MEAS - AO ROOT DIAM: 3 CM
BH CV ECHO MEAS - AO V2 MAX: 120.9 CM/SEC
BH CV ECHO MEAS - AO V2 VTI: 24.5 CM
BH CV ECHO MEAS - AVA(I,D): 2.4 CM2
BH CV ECHO MEAS - EDV(CUBED): 66.2 ML
BH CV ECHO MEAS - EDV(MOD-SP4): 44.2 ML
BH CV ECHO MEAS - EF(MOD-BP): 70 %
BH CV ECHO MEAS - EF(MOD-SP4): 74.4 %
BH CV ECHO MEAS - ESV(CUBED): 17.1 ML
BH CV ECHO MEAS - ESV(MOD-SP4): 11.3 ML
BH CV ECHO MEAS - FS: 36.3 %
BH CV ECHO MEAS - IVS/LVPW: 0.96 CM
BH CV ECHO MEAS - IVSD: 0.88 CM
BH CV ECHO MEAS - LA DIMENSION: 3.8 CM
BH CV ECHO MEAS - LV DIASTOLIC VOL/BSA (35-75): 27 CM2
BH CV ECHO MEAS - LV MASS(C)D: 111.7 GRAMS
BH CV ECHO MEAS - LV MAX PG: 3.8 MMHG
BH CV ECHO MEAS - LV MEAN PG: 2.3 MMHG
BH CV ECHO MEAS - LV SYSTOLIC VOL/BSA (12-30): 6.9 CM2
BH CV ECHO MEAS - LV V1 MAX: 97 CM/SEC
BH CV ECHO MEAS - LV V1 VTI: 21.2 CM
BH CV ECHO MEAS - LVIDD: 4 CM
BH CV ECHO MEAS - LVIDS: 2.6 CM
BH CV ECHO MEAS - LVOT AREA: 2.8 CM2
BH CV ECHO MEAS - LVOT DIAM: 1.88 CM
BH CV ECHO MEAS - LVPWD: 0.92 CM
BH CV ECHO MEAS - MV A MAX VEL: 48.9 CM/SEC
BH CV ECHO MEAS - MV DEC SLOPE: 199 CM/SEC2
BH CV ECHO MEAS - MV DEC TIME: 0.24 MSEC
BH CV ECHO MEAS - MV E MAX VEL: 47 CM/SEC
BH CV ECHO MEAS - MV E/A: 0.96
BH CV ECHO MEAS - MV MAX PG: 1.87 MMHG
BH CV ECHO MEAS - MV MEAN PG: 0.58 MMHG
BH CV ECHO MEAS - MV V2 VTI: 22.1 CM
BH CV ECHO MEAS - MVA(VTI): 2.7 CM2
BH CV ECHO MEAS - PA V2 MAX: 79.6 CM/SEC
BH CV ECHO MEAS - PI END-D VEL: 68.3 CM/SEC
BH CV ECHO MEAS - RV MAX PG: 0.81 MMHG
BH CV ECHO MEAS - RV V1 MAX: 45.1 CM/SEC
BH CV ECHO MEAS - RV V1 VTI: 11.4 CM
BH CV ECHO MEAS - SI(MOD-SP4): 20.1 ML/M2
BH CV ECHO MEAS - SV(LVOT): 58.8 ML
BH CV ECHO MEAS - SV(MOD-SP4): 32.9 ML
BH CV ECHO MEAS - TR MAX PG: 16.6 MMHG
BH CV ECHO MEAS - TR MAX VEL: 202.9 CM/SEC
BH CV ECHO SHUNT ASSESSMENT PERFORMED (HIDDEN SCRIPTING): 1
LV EF 2D ECHO EST: 70 %
MAXIMAL PREDICTED HEART RATE: 158 BPM
STRESS TARGET HR: 134 BPM

## 2022-12-09 ENCOUNTER — APPOINTMENT (OUTPATIENT)
Dept: LAB | Facility: HOSPITAL | Age: 62
End: 2022-12-09

## 2022-12-09 ENCOUNTER — LAB (OUTPATIENT)
Dept: LAB | Facility: HOSPITAL | Age: 62
End: 2022-12-09

## 2022-12-09 DIAGNOSIS — E78.49 OTHER HYPERLIPIDEMIA: ICD-10-CM

## 2022-12-09 DIAGNOSIS — R00.1 BRADYCARDIA: Primary | ICD-10-CM

## 2022-12-09 DIAGNOSIS — I15.0 RENOVASCULAR HYPERTENSION: ICD-10-CM

## 2022-12-09 DIAGNOSIS — R00.2 INTERMITTENT PALPITATIONS: ICD-10-CM

## 2022-12-09 DIAGNOSIS — I10 ESSENTIAL HYPERTENSION: ICD-10-CM

## 2022-12-09 DIAGNOSIS — R00.1 BRADYCARDIA: ICD-10-CM

## 2022-12-09 DIAGNOSIS — I15.0 RENOVASCULAR HYPERTENSION: Primary | ICD-10-CM

## 2022-12-09 DIAGNOSIS — R07.89 CHEST PAIN, ATYPICAL: ICD-10-CM

## 2022-12-09 LAB
ALBUMIN SERPL-MCNC: 4.6 G/DL (ref 3.5–5.2)
ANION GAP SERPL CALCULATED.3IONS-SCNC: 10 MMOL/L (ref 5–15)
BUN SERPL-MCNC: 13 MG/DL (ref 8–23)
BUN/CREAT SERPL: 17.6 (ref 7–25)
CALCIUM SPEC-SCNC: 9.4 MG/DL (ref 8.6–10.5)
CHLORIDE SERPL-SCNC: 106 MMOL/L (ref 98–107)
CO2 SERPL-SCNC: 27 MMOL/L (ref 22–29)
CREAT SERPL-MCNC: 0.74 MG/DL (ref 0.57–1)
EGFRCR SERPLBLD CKD-EPI 2021: 91.6 ML/MIN/1.73
GLUCOSE SERPL-MCNC: 94 MG/DL (ref 65–99)
PHOSPHATE SERPL-MCNC: 4 MG/DL (ref 2.5–4.5)
POTASSIUM SERPL-SCNC: 4.1 MMOL/L (ref 3.5–5.2)
SODIUM SERPL-SCNC: 143 MMOL/L (ref 136–145)

## 2022-12-09 PROCEDURE — 82088 ASSAY OF ALDOSTERONE: CPT

## 2022-12-09 PROCEDURE — 87086 URINE CULTURE/COLONY COUNT: CPT

## 2022-12-09 PROCEDURE — 83835 ASSAY OF METANEPHRINES: CPT

## 2022-12-09 PROCEDURE — 80069 RENAL FUNCTION PANEL: CPT

## 2022-12-09 PROCEDURE — 81050 URINALYSIS VOLUME MEASURE: CPT

## 2022-12-09 PROCEDURE — 82570 ASSAY OF URINE CREATININE: CPT

## 2022-12-09 PROCEDURE — 36415 COLL VENOUS BLD VENIPUNCTURE: CPT

## 2022-12-09 RX ORDER — AMLODIPINE BESYLATE 5 MG/1
5 TABLET ORAL DAILY
Qty: 30 TABLET | Refills: 2 | Status: SHIPPED | OUTPATIENT
Start: 2022-12-09 | End: 2023-01-10 | Stop reason: SDUPTHER

## 2022-12-10 LAB
BACTERIA SPEC AEROBE CULT: NORMAL
SPECIMEN STATUS: NORMAL

## 2022-12-12 ENCOUNTER — OFFICE VISIT (OUTPATIENT)
Dept: CARDIOLOGY | Facility: CLINIC | Age: 62
End: 2022-12-12

## 2022-12-12 VITALS
BODY MASS INDEX: 29.06 KG/M2 | OXYGEN SATURATION: 97 % | WEIGHT: 148 LBS | HEIGHT: 60 IN | DIASTOLIC BLOOD PRESSURE: 83 MMHG | HEART RATE: 59 BPM | SYSTOLIC BLOOD PRESSURE: 143 MMHG | RESPIRATION RATE: 18 BRPM

## 2022-12-12 DIAGNOSIS — I10 PRIMARY HYPERTENSION: ICD-10-CM

## 2022-12-12 DIAGNOSIS — E78.2 MIXED HYPERLIPIDEMIA: ICD-10-CM

## 2022-12-12 DIAGNOSIS — R00.2 INTERMITTENT PALPITATIONS: ICD-10-CM

## 2022-12-12 DIAGNOSIS — R00.1 BRADYCARDIA: ICD-10-CM

## 2022-12-12 DIAGNOSIS — R09.89 LABILE HYPERTENSION: Primary | ICD-10-CM

## 2022-12-12 PROCEDURE — 99214 OFFICE O/P EST MOD 30 MIN: CPT | Performed by: INTERNAL MEDICINE

## 2022-12-12 NOTE — PROGRESS NOTES
Cardiology Office Visit      Encounter Date:  12/12/2022    Patient ID:   Tiny Avina is a 62 y.o. female.      Reason For Followup:  Hypertension and intermittent accelerated hypertension  Palpitations and flushing      Brief Clinical History:  Dear Jakob Boyce APRN    I had the pleasure of seeing Tiny Avina today. As you are well aware, this is a 62 y.o. female past medical history that is significant for history of thyroid nodule prior history of bradycardia intermittent palpitations and prior diagnosis of mitral valve prolapse here for follow-up for further evaluation and treatment options for fluctuating blood pressure    Interval History:  Intermittent episodes of accelerated blood pressure but in between patient is normotensive  Multiple ER visits  Patient having episodes where her blood pressure jumps up to 180-200 with symptoms  Rest of the time patient is normotensive  Intermittent palpitations and dizziness  No syncope  Prior evaluation work-up for bradycardia  Blood pressure readings are somewhat better with Norvasc still has intermittent episodes of elevated blood pressure  Assessment & Plan    Impressions:  Intermittent accelerated hypertension  Palpitations  Fatigue and dizziness  Chest discomfort  Thyroid nodule  Prior history of mitral valve prolapse  Hyperlipidemia  Echocardiogram with normal LV systolic  Normal exercise stress test  Results of the renal artery ultrasound and also results of the 24-hour urine studies are pending at this time  Recommendations:  Patient was given hydralazine 10 mg to use as needed for elevated blood pressure  Continue Norvasc 5 mg p.o. once a day  Recent labs and work-up and evaluation in the emergency room reviewed and discussed with patient  Recent labs and work-up reviewed and discussed with patient  Home blood pressure monitoring  Work-up for secondary hypertension including renal artery ultrasound I will also metanephrine work-up  Prior  "work-up and evaluation reviewed and discussed with patient  Follow-up on the results of the labs  Follow-up in office in 3 months    Objective:    Vitals:  Vitals:    12/12/22 1356   BP: 143/83   BP Location: Left arm   Patient Position: Sitting   Cuff Size: Large Adult   Pulse: 59   Resp: 18   SpO2: 97%   Weight: 67.1 kg (148 lb)   Height: 152.4 cm (60\")       Physical Exam:    General: Alert, cooperative, no distress, appears stated age  Head:  Normocephalic, atraumatic, mucous membranes moist  Eyes:  Conjunctiva/corneas clear, EOM's intact     Neck:  Supple,  no adenopathy;      Lungs: Clear to auscultation bilaterally, no wheezes rhonchi rales are noted  Chest wall: No tenderness  Heart::  Regular rate and rhythm, S1 and S2 normal, no murmur, rub or gallop  Abdomen: Soft, non-tender, nondistended bowel sounds active  Extremities: No cyanosis, clubbing, or edema  Pulses: 2+ and symmetric all extremities  Skin:  No rashes or lesions  Neuro/psych: A&O x3. CN II through XII are grossly intact with appropriate affect      Allergies:  Allergies   Allergen Reactions   • Asa [Aspirin] Other (See Comments)     Stomach pains       Medication Review:     Current Outpatient Medications:   •  amLODIPine (NORVASC) 5 MG tablet, Take 1 tablet by mouth Daily., Disp: 30 tablet, Rfl: 2  •  ascorbic acid (VITAMIN C) 1000 MG tablet, Take 1,000 mg by mouth Daily., Disp: , Rfl:   •  B Complex Vitamins (VITAMIN B COMPLEX) capsule capsule, Take 1 capsule by mouth Daily., Disp: , Rfl:   •  Calcium-Magnesium-Zinc 167-83-8 MG tablet, Take 1 tablet by mouth Daily., Disp: , Rfl:   •  cholecalciferol (VITAMIN D3) 25 MCG (1000 UT) tablet, Take 1,000 Units by mouth Daily., Disp: , Rfl:   •  Coenzyme Q10 (Co Q10) 100 MG capsule, , Disp: , Rfl:   •  Garlic 1000 MG capsule, Take 1 capsule by mouth Daily., Disp: , Rfl:   •  hydrALAZINE (APRESOLINE) 10 MG tablet, Take 1 tablet by mouth 3 (Three) Times a Day., Disp: 30 tablet, Rfl: 0  •  Melatonin 5 " MG capsule, Take 1 each by mouth 2 (two) times a day., Disp: 100 each, Rfl:   •  Omega 3-6-9 Fatty Acids (TRIPLE OMEGA-3-6-9 PO), Take 1 capsule by mouth Daily., Disp: , Rfl:   •  vitamin A 70416 UNIT capsule, Take 10,000 Units by mouth Daily., Disp: , Rfl:     Family History:  Family History   Problem Relation Age of Onset   • Thyroid disease Mother         goiter, removed   • Hypertension Mother            • Heart failure Mother         Heart failed   • Cancer Father         bladder   • Thyroid disease Sister    • Liver cancer Sister    • Thyroid disease Other         goiter, removed   • Diabetes Maternal Uncle    • Sudden death Maternal Uncle    • Cancer Sister    • Hypertension Maternal Uncle         (stroke)       Past Medical History:  Past Medical History:   Diagnosis Date   • Arrhythmia    • Chest pain    • Diverticulosis    • Enlarged thyroid    • H/O varicose veins     phlebitis   • Heart murmur    • Heart valve disease    • Hyperlipidemia    • Hypertension    • Insomnia    • Irregular heart beat    • Mitral valve prolapse    • Palpitations    • Scoliosis    • Vitamin D deficiency        Past surgical History:  Past Surgical History:   Procedure Laterality Date   • COLONOSCOPY     • CYST REMOVAL Left     breast  //  benign       Social History:  Social History     Socioeconomic History   • Marital status:    Tobacco Use   • Smoking status: Never   • Smokeless tobacco: Never   • Tobacco comments:     never a smoker   Vaping Use   • Vaping Use: Never used   Substance and Sexual Activity   • Alcohol use: No     Comment: caffeine use   • Drug use: No   • Sexual activity: Not Currently     Birth control/protection: Abstinence       Review of Systems:  The following systems were reviewed as they relate to the cardiovascular system: Constitutional, Eyes, ENT, Cardiovascular, Respiratory, Gastrointestinal, Integumentary, Neurological, Psychiatric, Hematologic, Endocrine,  Musculoskeletal, and Genitourinary. The pertinent cardiovascular findings are reported above with all other cardiovascular points within those systems being negative.    Diagnostic Study Review:     Current Electrocardiogram:  Procedures      NOTE: The following portions of the patient's history were reviewed and updated this visit as appropriate: allergies, current medications, past family history, past medical history, past social history, past surgical history and problem list.

## 2022-12-13 LAB
METANEPH FREE SERPL-MCNC: <10 PG/ML (ref 0–88)
NORMETANEPHRINE SERPL-MCNC: 35.7 PG/ML (ref 0–285.2)

## 2022-12-14 ENCOUNTER — TELEPHONE (OUTPATIENT)
Dept: FAMILY MEDICINE CLINIC | Facility: CLINIC | Age: 62
End: 2022-12-14

## 2022-12-14 NOTE — TELEPHONE ENCOUNTER
Caller: Tiny Avina    Relationship: Self    Best call back number: [phone] (Home)    Caller requesting test results: Tiny Avina    What test was performed: 24-HOUR URINE TEST AND BLOOD WORK    When was the test performed: DROPPED OFF Friday 12/05/2022 AT [facility]     Where was the test performed: [facility] AND AT HOME    Additional notes: PATIENT IS ASKING FOR A CALL BACK REGARDING RESULTS ASAP

## 2022-12-15 LAB
ALDOST 24H UR-MRATE: <6.75 UG/24 HR (ref 0–19)
ALDOST UR-MCNC: <2.5 UG/L
CREAT UR-MCNC: 39.1 MG/DL
METANEPH 24H UR-MRATE: 92 UG/24 HR (ref 36–209)
METANEPHS 24H UR-MCNC: 324 UG/L
METANEPHS 24H UR-MCNC: 34 UG/L
METANEPHS/CREAT UR: 1.9 UG/MG CREAT (ref 0–1)
NORMETANEPHRINE 24H UR-MCNC: 414 UG/L
NORMETANEPHRINE 24H UR-MCNC: 84 UG/L
NORMETANEPHRINE 24H UR-MRATE: 227 UG/24 HR (ref 131–612)

## 2022-12-19 ENCOUNTER — TELEPHONE (OUTPATIENT)
Dept: CARDIOLOGY | Facility: CLINIC | Age: 62
End: 2022-12-19

## 2022-12-19 ENCOUNTER — TELEPHONE (OUTPATIENT)
Dept: FAMILY MEDICINE CLINIC | Facility: CLINIC | Age: 62
End: 2022-12-19

## 2022-12-19 NOTE — TELEPHONE ENCOUNTER
Caller:     Relationship:    Tiny Avina (Self) [phone] (Home)       What is the medical concern/diagnosis: HIGH INDICATION  ADRENAL GLAND TUMOR     What specialty or service is being requested: ENDOCRINOLOGIST - SHE IS ALREADY IN [facility]    What is the provider, practice or medical service name: [facility]     What is the office location:   [facility] [address], [address]  [place] [address]  [phone]    What is the office phone number:   FAX# [phone]    Any additional details: NEEDS ASAP

## 2022-12-19 NOTE — TELEPHONE ENCOUNTER
PT IS IN [place] AND NEEDS AN ORDER FOR A MRI SENT TO [facility] IN [place], [place].     FAX: [phone]    ANY QUESTIONS PLEASE CALL PATIENT:   [phone]

## 2022-12-20 ENCOUNTER — TELEPHONE (OUTPATIENT)
Dept: CARDIOLOGY | Facility: CLINIC | Age: 62
End: 2022-12-20

## 2022-12-20 NOTE — TELEPHONE ENCOUNTER
Patient notified that Dr. Hook did not order MRI as she was requesting.  Patient stated MRI for Adrenal Gland.  Advised patient to contact PCP Jakob Cohen for further testing.  Dr. Hook only order US of Renal Arteries.  Patient acknowledged and will contact PCP.

## 2022-12-20 NOTE — TELEPHONE ENCOUNTER
Caller: Tiny Avina    Relationship: Self    Best call back number: [phone]    What was the call regarding: PATIENT REQUESTING CALL BACK TO DISCUSS ASAP.    Do you require a callback: PLEASE CALL ASAP

## 2022-12-20 NOTE — TELEPHONE ENCOUNTER
Caller: Tiny Avina    Relationship: Self    Best call back number: [phone]    What was the call regarding: PATIENT WAS CALLING TO CHECK ON THE STATUS OF THIS REFERRAL.     Do you require a callback: YES

## 2022-12-21 ENCOUNTER — TELEPHONE (OUTPATIENT)
Dept: FAMILY MEDICINE CLINIC | Facility: CLINIC | Age: 62
End: 2022-12-21

## 2022-12-21 NOTE — TELEPHONE ENCOUNTER
Caller: Tiny Avina    Relationship: Self    Best call back number: [phone]    What is the best time to reach you: ANY    Who are you requesting to speak with (clinical staff, provider,  specific staff member): MALIKA    What was the call regarding: PATIENT IS REQUESTING TO SPEAK WITH MALIKA.     PLEASE ADVISE.     THANKYOU

## 2023-01-10 ENCOUNTER — TELEMEDICINE (OUTPATIENT)
Dept: FAMILY MEDICINE CLINIC | Facility: CLINIC | Age: 63
End: 2023-01-10
Payer: OTHER GOVERNMENT

## 2023-01-10 DIAGNOSIS — I10 PRIMARY HYPERTENSION: ICD-10-CM

## 2023-01-10 DIAGNOSIS — I10 PRIMARY HYPERTENSION: Primary | ICD-10-CM

## 2023-01-10 DIAGNOSIS — E04.1 THYROID NODULE: ICD-10-CM

## 2023-01-10 DIAGNOSIS — R00.2 PALPITATIONS: ICD-10-CM

## 2023-01-10 PROCEDURE — 99214 OFFICE O/P EST MOD 30 MIN: CPT | Performed by: REGISTERED NURSE

## 2023-01-10 RX ORDER — AMLODIPINE BESYLATE 5 MG/1
5 TABLET ORAL DAILY
Qty: 90 TABLET | Refills: 1 | Status: SHIPPED | OUTPATIENT
Start: 2023-01-10

## 2023-01-10 RX ORDER — HYDRALAZINE HYDROCHLORIDE 10 MG/1
10 TABLET, FILM COATED ORAL 3 TIMES DAILY
Qty: 90 TABLET | Refills: 1 | Status: SHIPPED | OUTPATIENT
Start: 2023-01-10 | End: 2023-01-10

## 2023-01-10 RX ORDER — HYDRALAZINE HYDROCHLORIDE 10 MG/1
TABLET, FILM COATED ORAL
Qty: 270 TABLET | Refills: 1 | Status: SHIPPED | OUTPATIENT
Start: 2023-01-10

## 2023-01-10 NOTE — PROGRESS NOTES
[facility] PRIMARY CARE  Patient: Tiny Avina   Age: 62 y.o.  Sex: female  :  1960  MRN: [id number]    Tiny Avina was located at home and I was located at my office for this telemedicine/telephone encounter. We utilized the [employer] video option for the encounter and Tiny Avina and I were able to hear and see each other simultaneously in real time. I introduced myself and verified Tiny Avina identity. I explained how the telemedicine visit will occur. I advised Tiny Avina that technology-related delays and breaches of privacy are potential risks associated with conducting the encounter via telemedicine.    I also advised Tiny Avina that at any point she may terminate the telemedicine encounter and withdraw her consent for receiving care via telemedicine without affecting her ability to receive future care from us, and that I may also terminate the telemedicine encounter if I determine that an in-person visit is more appropriate for the condition[s] for which treatment is sought.    Having covered these considerations, Tiny Avina verbally acknowledged them and gave consent for the use of telemedicine in her care.    Start time: 907  End time 935    CHIEF COMPLAINT:  Chief Complaint   Patient presents with   • Hypertension   • Palpitations        The following portions of the chart were reviewed this encounter and updated as appropriate:    Patient is a 62-year-old female who presents to the clinic today via [email or web address] telehealth visit with concerns of hypertension that is worsened since our last visit May 4, 2022.  Patient denies any chest pain, shortness of breath, or any fevers.  Patient denies any known exposure to COVID, flu, or any other contagious illnesses.    In regards to hypertension, patient shares that she was in the ER on 22 with high blood pressure.  She shares that her blood pressure reach 200's systolic bp and over 100 for diastolic.  She  shares that her blood pressure would return to normal on its own after couple hours.  She shares that this was consistently happening with spiking pressures at night.  Patient sees Dr. Ayala with [facility] cardiology.  She followed up with Dr. Ayala in regards to this.  Patient was placed on hydralazine as needed.  Patient shares this helped for couple weeks but then she started having headaches and noticed her blood pressure was spiking really high again so she called Dr. Ayala and was placed on amlodipine daily with the hydralazine.  These pressures Increasing after patient went to [place] for the winter so she went back to the ER in [facility] on December 27, 2022.  She shares that her blood pressures were near 200 again.  The ER doctor did a CT scan for her and noticed that she had cysts on her thyroid, liver, and kidneys.  And also noticed uterine fibroids.  She shares that she is not concerned about that fibroids at this time but she does want to see an endocrinologist in regards to the mass on her thyroid.  Patient also shares that she has been having intermittent palpitations with this.  I did advise her to continue following up with cardiology.    Patient shares that her blood pressure has been better controlled since then but would like to further investigate what is going on with the cyst on her thyroid.       There were no vitals taken for this visit.   Allergies   Allergen Reactions   • Asa [Aspirin] Other (See Comments)     Stomach pains     Past Medical History:   Diagnosis Date   • Arrhythmia    • Chest pain    • Diverticulosis    • Enlarged thyroid    • H/O varicose veins     phlebitis   • Heart murmur    • Heart valve disease    • Hyperlipidemia    • Hypertension 2021   • Insomnia    • Irregular heart beat    • Mitral valve prolapse    • Palpitations    • Scoliosis    • Vitamin D deficiency        REVIEW OF SYSTEMS  Review of Systems   Constitutional: Negative.  Negative for  activity change, chills, fatigue and fever.   HENT: Negative for congestion, dental problem, ear pain, hearing loss, rhinorrhea, sinus pain, sore throat, tinnitus and trouble swallowing.    Eyes: Negative.  Negative for pain and visual disturbance.   Respiratory: Negative.  Negative for cough, chest tightness, shortness of breath and wheezing.    Cardiovascular: Positive for palpitations. Negative for chest pain and leg swelling.   Gastrointestinal: Negative.  Negative for abdominal pain, diarrhea, nausea and vomiting.   Endocrine: Negative.  Negative for polydipsia, polyphagia and polyuria.   Genitourinary: Negative.  Negative for difficulty urinating, dysuria, frequency and urgency.   Musculoskeletal: Negative.  Negative for arthralgias, back pain and myalgias.   Skin: Negative.  Negative for color change, pallor, rash and wound.   Allergic/Immunologic: Negative.  Negative for environmental allergies.   Neurological: Positive for headaches. Negative for dizziness, speech difficulty, weakness, light-headedness and numbness.   Hematological: Negative.    Psychiatric/Behavioral: Negative.  Negative for confusion, decreased concentration, self-injury and suicidal ideas. The patient is not nervous/anxious.    All other systems reviewed and are negative.             LAB REVIEW  Lab on 12/09/2022   Component Date Value Ref Range Status   • Urine Culture 12/09/2022 25,000 CFU/mL Mixed Mary Isolated   Final   • Glucose 12/09/2022 94  65 - 99 mg/dL Final   • BUN 12/09/2022 13  8 - 23 mg/dL Final   • Creatinine 12/09/2022 0.74  0.57 - 1.00 mg/dL Final   • Sodium 12/09/2022 143  136 - 145 mmol/L Final   • Potassium 12/09/2022 4.1  3.5 - 5.2 mmol/L Final   • Chloride 12/09/2022 106  98 - 107 mmol/L Final   • CO2 12/09/2022 27.0  22.0 - 29.0 mmol/L Final   • Calcium 12/09/2022 9.4  8.6 - 10.5 mg/dL Final   • Albumin 12/09/2022 4.60  3.50 - 5.20 g/dL Final   • Phosphorus 12/09/2022 4.0  2.5 - 4.5 mg/dL Final   • Anion Gap  12/09/2022 10.0  5.0 - 15.0 mmol/L Final   • BUN/Creatinine Ratio 12/09/2022 17.6  7.0 - 25.0 Final   • eGFR 12/09/2022 91.6  >60.0 mL/min/1.73 Final    National Kidney Foundation and American Society of Nephrology (ASN) Task Force recommended calculation based on the Chronic Kidney Disease Epidemiology Collaboration (CKD-EPI) equation refit without adjustment for race.   • Normetanephrine, Urine 12/09/2022 84  Undefined ug/L Final   • Normetanephrine, 24H Ur 12/09/2022 227  131 - 612 ug/24 hr Final   • Metanephrines, Urine 12/09/2022 34  Undefined ug/L Final   • Metaneph, Total, 24H Ur 12/09/2022 92  36 - 209 ug/24 hr Final   • Aldosterone Urine 12/09/2022 <2.50  Not Estab. ug/L Final    This test was developed and its performance characteristics  determined by [facility]. It has not been cleared or approved  by the Food and Drug Administration.   • Aldosterone, 24H Ur 12/09/2022 <6.75  0.00 - 19.00 ug/24 hr Final                                     Adult Ranges                      Low Sodium Intake     20.00 - 80.00                      Normal Sodium Intake   0.00 - 19.00                      High Sodium Intake     0.00 - 12.00   • Normetanephrine 12/09/2022 35.7  0.0 - 285.2 pg/mL Final   • Metanephrine 12/09/2022 <10.0  0.0 - 88.0 pg/mL Final   • Specimen Status 12/09/2022 Comment   Final    Written Authorization  Written Authorization  Written Authorization Received.  Authorization received from ORIGINAL DATA INTERFACE ORDER 12-  Logged by Eileen Short   • Creatinine, Urine 12/09/2022 39.1  Not Estab. mg/dL Final   • Normetanephrine, Urine 12/09/2022 414  Undefined ug/L Final   • Metanephrines, Urine 12/09/2022 324  Undefined ug/L Final   • Metanephrine/Creatinine Ratio 12/09/2022 1.9 (H)  0.0 - 1.0 ug/mg Creat Final   Hospital Outpatient Visit on 12/06/2022   Component Date Value Ref Range Status   • Target HR (85%) 12/06/2022 134  bpm Final   • Max. Pred. HR (100%) 12/06/2022 158  bpm Final   • LVIDd  12/06/2022 4.0  cm Final   • LVIDs 12/06/2022 2.6  cm Final   • IVSd 12/06/2022 0.88  cm Final   • LVPWd 12/06/2022 0.92  cm Final   • FS 12/06/2022 36.3  % Final   • IVS/LVPW 12/06/2022 0.96  cm Final   • ESV(cubed) 12/06/2022 17.1  ml Final   • LV Sys Vol (BSA corrected) 12/06/2022 6.9  cm2 Final   • EDV(cubed) 12/06/2022 66.2  ml Final   • LV Beauchamp Vol (BSA corrected) 12/06/2022 27.0  cm2 Final   • LVOT area 12/06/2022 2.8  cm2 Final   • LV mass(C)d 12/06/2022 111.7  grams Final   • LVOT diam 12/06/2022 1.88  cm Final   • EDV(MOD-sp4) 12/06/2022 44.2  ml Final   • ESV(MOD-sp4) 12/06/2022 11.3  ml Final   • SV(MOD-sp4) 12/06/2022 32.9  ml Final   • SI(MOD-sp4) 12/06/2022 20.1  ml/m2 Final   • EF(MOD-sp4) 12/06/2022 74.4  % Final   • MV E max tito 12/06/2022 47.0  cm/sec Final   • MV A max tito 12/06/2022 48.9  cm/sec Final   • MV dec time 12/06/2022 0.24  msec Final   • MV E/A 12/06/2022 0.96   Final   • SV(LVOT) 12/06/2022 58.8  ml Final   • LV V1 max 12/06/2022 97.0  cm/sec Final   • LV V1 max PG 12/06/2022 3.8  mmHg Final   • LV V1 mean PG 12/06/2022 2.30  mmHg Final   • LV V1 VTI 12/06/2022 21.2  cm Final   • Ao pk tito 12/06/2022 120.9  cm/sec Final   • Ao max PG 12/06/2022 5.8  mmHg Final   • Ao mean PG 12/06/2022 3.3  mmHg Final   • Ao V2 VTI 12/06/2022 24.5  cm Final   • MIREILLE(I,D) 12/06/2022 2.40  cm2 Final   • AI P1/2t 12/06/2022 542.9  msec Final   • MV max PG 12/06/2022 1.87  mmHg Final   • MV mean PG 12/06/2022 0.58  mmHg Final   • MV V2 VTI 12/06/2022 22.1  cm Final   • MVA(VTI) 12/06/2022 2.7  cm2 Final   • MV dec slope 12/06/2022 199.0  cm/sec2 Final   • TR max tito 12/06/2022 202.9  cm/sec Final   • TR max PG 12/06/2022 16.6  mmHg Final   • RV V1 max PG 12/06/2022 0.81  mmHg Final   • RV V1 max 12/06/2022 45.1  cm/sec Final   • RV V1 VTI 12/06/2022 11.4  cm Final   • PA V2 max 12/06/2022 79.6  cm/sec Final   • PI end-d tito 12/06/2022 68.3  cm/sec Final   • Ao root diam 12/06/2022 3.0  cm Final   • ACS  12/06/2022 2.17  cm Final   • EF(MOD-bp) 12/06/2022 70.0  % Final   • LA dimension (2D)  12/06/2022 3.8  cm Final   • BH CV ECHO SHUNT ASSESSMENT PERFOR* 12/06/2022 1   Final   • Echo EF Estimated 12/06/2022 70.0  % Final   Hospital Outpatient Visit on 12/06/2022   Component Date Value Ref Range Status   • Target HR (85%) 12/06/2022 134  bpm Final   • Max. Pred. HR (100%) 12/06/2022 158  bpm Final   • BH CV STRESS PROTOCOL 1 12/06/2022 Est[place]   Final   • Stage 1 12/06/2022 1   Final   • HR Stage 1 12/06/2022 98   Final   • BP Stage 1 12/06/2022 146/74   Final   • Duration Min Stage 1 12/06/2022 3   Final   • Duration Sec Stage 1 12/06/2022 0   Final   • Grade Stage 1 12/06/2022 10   Final   • Speed Stage 1 12/06/2022 1.7   Final   • BH CV STRESS METS STAGE 1 12/06/2022 5   Final   • Stage 2 12/06/2022 2   Final   • HR Stage 2 12/06/2022 139   Final   • BP Stage 2 12/06/2022 166/72   Final   • Duration Min Stage 2 12/06/2022 3   Final   • Duration Sec Stage 2 12/06/2022 0   Final   • Grade Stage 2 12/06/2022 12   Final   • Speed Stage 2 12/06/2022 2.5   Final   • BH CV STRESS METS STAGE 2 12/06/2022 7.5   Final   • Stage 3 12/06/2022 3   Final   • HR Stage 3 12/06/2022 160   Final   • BP Stage 3 12/06/2022 na   Final   • Duration Min Stage 3 12/06/2022 1   Final   • Duration Sec Stage 3 12/06/2022 1   Final   • Grade Stage 3 12/06/2022 14   Final   • Speed Stage 3 12/06/2022 3.4   Final   • BH CV STRESS METS STAGE 3 12/06/2022 10.1   Final   • Baseline HR 12/06/2022 67  bpm Final   • Baseline BP 12/06/2022 144/76  mmHg Final   • Peak HR 12/06/2022 160  bpm Final   • Percent Max Pred HR 12/06/2022 101.27  % Final   • Percent Target HR 12/06/2022 119  % Final   • Peak BP 12/06/2022 172/70  mmHg Final   • Recovery HR 12/06/2022 84  bpm Final   • Recovery BP 12/06/2022 120/76  mmHg Final   • Exercise duration (min) 12/06/2022 7  min Final   • Exercise duration (sec) 12/06/2022 0  sec Final   • Estimated workload  12/06/2022 10.1  METS Final   Results Encounter on 10/30/2022   Component Date Value Ref Range Status   • Cologuard 11/11/2022 Negative  Negative Final    Comment:   NEGATIVE TEST RESULT. A negative Cologuard result indicates a low likelihood that a colorectal cancer (CRC) or advanced adenoma (adenomatous polyps with more advanced pre-malignant features)  is present. The chance that a person with a negative Cologuard test has a colorectal cancer is less than 1 in 1500 (negative predictive value >99.9%) or has an  advanced adenoma is less than  5.3% (negative predictive value 94.7%). These data are based on a prospective cross-sectional study of 10,000 individuals at average risk for colorectal cancer who were screened with both Cologuard and colonoscopy. ([place] al, N Engl J Med 2014;370(14):3271-2453) The normal value (reference range) for this assay is negative.    COLOGUARD RE-SCREENING RECOMMENDATION: Periodic colorectal cancer screening is an important part of preventive healthcare for asymptomatic individuals at average risk for colorectal cancer.  Following a negative Cologuard result, the American Cancer Society and U.S.                            Multi-Society Task Force screening guidelines recommend a Cologuard re-screening interval of 3 years.   References: American Cancer Society Guideline for Colorectal Cancer Screening: https://www.cancer.org/cancer/colon-rectal-cancer/qaivuyqyu-rsrfhciip-wqmpeej/acs-recommendations.html.; Ronen DK, Fletcher CR, Rigo RAMIREZK, Colorectal Cancer Screening: Recommendations for Physicians and Patients from the U.S. Multi-Society Task Force on Colorectal Cancer Screening , Am J Gastroenterology 2017; 112:0541-3378.    TEST DESCRIPTION: Composite algorithmic analysis of stool DNA-biomarkers with hemoglobin immunoassay.   Quantitative values of individual biomarkers are not reportable and are not associated with individual biomarker result reference ranges. Cologuard is  intended for colorectal cancer screening of adults of either sex, 45 years or older, who are at average-risk for colorectal cancer (CRC). Cologuard has been approved for use by the U.S. FDA. The performance of Cologuard was                            established in a cross sectional study of average-risk adults aged 50-84. Cologuard performance in patients ages 45 to 49 years was estimated by sub-group analysis of near-age groups. Colonoscopies performed for a positive result may find as the most clinically significant lesion: colorectal cancer [4.0%], advanced adenoma (including sessile serrated polyps greater than or equal to 1cm diameter) [20%] or non- advanced adenoma [31%]; or no colorectal neoplasia [45%]. These estimates are derived from a prospective cross-sectional screening study of 10,000 individuals at average risk for colorectal cancer who were screened with both Cologuard and colonoscopy. ([place] al, N Engl J Med 2014;370(14):7788-6374.) Cologuard may produce a false negative or false positive result (no colorectal cancer or precancerous polyp present at colonoscopy follow up). A negative Cologuard test result does not guarantee the absence of CRC or advanced adenoma (pre-cancer). The current Cologuard                            screening interval is every 3 years. (American Cancer Society and U.S. Multi-Society Task Force). Cologuard performance data in a 10,000 patient pivotal study using colonoscopy as the reference method can be accessed at the following location: www.[email or web address]Any[employer]/results. Additional description of the Cologuard test process, warnings and precautions can be found at [email or web address].             Diagnoses and all orders for this visit:    1. Primary hypertension (Primary)  -     hydrALAZINE (APRESOLINE) 10 MG tablet; Take 1 tablet by mouth 3 (Three) Times a Day.  Dispense: 90 tablet; Refill: 1  -     amLODIPine (NORVASC) 5 MG tablet; Take 1 tablet by mouth Daily.   Dispense: 90 tablet; Refill: 1    2. Palpitations    3. Thyroid nodule  -     Ambulatory Referral to Endocrinology        -Reordered hydralazine and Norvasc for patient.  -Referral to endocrinology placed for thyroid mass that was found by ER in [place].  -ER red flags discussed with patient.  -Follow-up in 4 weeks or sooner if needed.    1. Primary hypertension    2. Palpitations    3. Thyroid nodule         MDM  Number of Diagnoses or Management Options  Palpitations: established, worsening  Primary hypertension: established, worsening  Thyroid nodule: established, worsening     Amount and/or Complexity of Data Reviewed  Clinical lab tests: reviewed  Tests in the radiology section of CPT®: reviewed  Tests in the medicine section of CPT®: reviewed  Discussion of test results with the performing providers: no  Decide to obtain previous medical records or to obtain history from someone other than the patient: yes  Obtain history from someone other than the patient: no  Review and summarize past medical records: yes  Discuss the patient with other providers: no  Independent visualization of images, tracings, or specimens: no    Risk of Complications, Morbidity, and/or Mortality  Presenting problems: high  Diagnostic procedures: low  Management options: high    Critical Care  Total time providing critical care: 30-74 minutes    Patient Progress  Patient progress: stable      ANTHONY Baird, FNP-BC  1/10/2023 10:58 EST

## 2023-03-23 ENCOUNTER — TRANSCRIBE ORDERS (OUTPATIENT)
Dept: ADMINISTRATIVE | Facility: HOSPITAL | Age: 63
End: 2023-03-23
Payer: OTHER GOVERNMENT

## 2023-03-23 DIAGNOSIS — Z13.6 ENCOUNTER FOR SCREENING FOR VASCULAR DISEASE: Primary | ICD-10-CM

## 2023-03-27 ENCOUNTER — TELEPHONE (OUTPATIENT)
Dept: FAMILY MEDICINE CLINIC | Facility: CLINIC | Age: 63
End: 2023-03-27

## 2023-03-27 NOTE — TELEPHONE ENCOUNTER
Caller: Tiny Avina    Relationship: Self    Best call back number: [phone]    What is the medical concern/diagnosis: CYST ON RIGHT KIDNEY, BACK PAIN    What specialty or service is being requested: NEPHROLOGY    What is the provider, practice or medical service name: [facility]     Any additional details: PLEASE CALL PATIENT ONCE REFERRAL HAS BEEN PLACED

## 2023-04-18 ENCOUNTER — OFFICE VISIT (OUTPATIENT)
Dept: FAMILY MEDICINE CLINIC | Facility: CLINIC | Age: 63
End: 2023-04-18
Payer: OTHER GOVERNMENT

## 2023-04-18 VITALS
DIASTOLIC BLOOD PRESSURE: 74 MMHG | WEIGHT: 153.2 LBS | HEIGHT: 60 IN | TEMPERATURE: 97.3 F | SYSTOLIC BLOOD PRESSURE: 135 MMHG | HEART RATE: 51 BPM | OXYGEN SATURATION: 97 % | BODY MASS INDEX: 30.08 KG/M2

## 2023-04-18 DIAGNOSIS — Z13.29 SCREENING FOR THYROID DISORDER: ICD-10-CM

## 2023-04-18 DIAGNOSIS — Z13.228 SCREENING FOR ENDOCRINE, METABOLIC AND IMMUNITY DISORDER: Primary | ICD-10-CM

## 2023-04-18 DIAGNOSIS — I10 PRIMARY HYPERTENSION: ICD-10-CM

## 2023-04-18 DIAGNOSIS — N28.1 RENAL CYST: ICD-10-CM

## 2023-04-18 DIAGNOSIS — E04.1 THYROID CYST: ICD-10-CM

## 2023-04-18 DIAGNOSIS — Z13.29 SCREENING FOR ENDOCRINE, METABOLIC AND IMMUNITY DISORDER: Primary | ICD-10-CM

## 2023-04-18 DIAGNOSIS — Z13.0 SCREENING FOR ENDOCRINE, METABOLIC AND IMMUNITY DISORDER: Primary | ICD-10-CM

## 2023-04-18 DIAGNOSIS — Z13.1 SCREENING FOR DIABETES MELLITUS: ICD-10-CM

## 2023-04-18 DIAGNOSIS — Z13.0 SCREENING FOR DEFICIENCY ANEMIA: ICD-10-CM

## 2023-04-18 DIAGNOSIS — E78.2 MIXED HYPERLIPIDEMIA: ICD-10-CM

## 2023-04-18 DIAGNOSIS — R79.89 LOW VITAMIN D LEVEL: ICD-10-CM

## 2023-04-18 DIAGNOSIS — R79.0 LOW MAGNESIUM LEVEL: ICD-10-CM

## 2023-04-18 DIAGNOSIS — E04.1 THYROID NODULE: ICD-10-CM

## 2023-04-18 NOTE — PROGRESS NOTES
Chief Complaint  Annual Exam, referral (Wants to discuss referral to nephrologist due to cyst on right kidney), and Hypertension (Spikes mainly at night even with medication. Ongoing for at least 6 months.)    Subjective    History of Present Illness {CC  Problem List  Visit  Diagnosis   Encounters  Notes  Medications  Labs  Result Review Imaging  Media :23}     Tiny Avina presents to [facility] for Annual Exam, referral (Wants to discuss referral to nephrologist due to cyst on right kidney), and Hypertension (Spikes mainly at night even with medication. Ongoing for at least 6 months.).    History of Present Illness  Patient is a 63-year-old female who presents to the clinic today for 6-month routine follow-up for hypertension and hyperlipidemia with concerns of recent findings of thyroid nodule and renal cysts.  Patient denies any chest pain, shortness of breath, or any fevers.  Patient denies any known exposure to COVID, flu, or any other contagious illnesses.    In regards to hypertension, patient is controlled with amlodipine and hydralazine.  Her blood pressure today is 135/74.  Patient has no concerns in regards to her blood pressure at this time she shares.    In regards to hyperlipidemia, patient tries to maintain a low-fat diet when possible.  Patient does take omega-3 to help control this.  Patient has no concerns about her cholesterol at this time she shares.    In regards to recent imaging findings, patient will while in [place], found a thyroid nodule and a renal cyst.  We discussed this and patient would like a referral to nephrology to discuss the renal cyst and a referral to ear nose and throat to discuss the thyroid nodule.  I have placed both these referrals today.       Review of Systems   Constitutional: Negative.  Negative for activity change, chills, fatigue and fever.   HENT: Negative.  Negative for congestion, dental problem, ear pain, hearing  "loss, rhinorrhea, sinus pain, sore throat, tinnitus and trouble swallowing.    Eyes: Negative.  Negative for pain and visual disturbance.   Respiratory: Negative.  Negative for cough, chest tightness, shortness of breath and wheezing.    Cardiovascular: Negative.  Negative for chest pain, palpitations and leg swelling.   Gastrointestinal: Negative.  Negative for abdominal pain, diarrhea, nausea and vomiting.   Endocrine: Negative.  Negative for polydipsia, polyphagia and polyuria.   Genitourinary: Negative.  Negative for difficulty urinating, dysuria, frequency and urgency.   Musculoskeletal: Negative.  Negative for arthralgias, back pain and myalgias.   Skin: Negative.  Negative for color change, pallor, rash and wound.   Allergic/Immunologic: Negative.  Negative for environmental allergies.   Neurological: Negative.  Negative for dizziness, speech difficulty, weakness, light-headedness, numbness and headaches.   Hematological: Negative.    Psychiatric/Behavioral: Negative.  Negative for confusion, decreased concentration, self-injury and suicidal ideas. The patient is not nervous/anxious.    All other systems reviewed and are negative.       Objective     Vital Signs:   /74 (BP Location: Left arm, Patient Position: Sitting, Cuff Size: Adult)   Pulse 51   Temp 97.3 °F (36.3 °C) (Oral)   Ht 152.4 cm (60\")   Wt 69.5 kg (153 lb 3.2 oz)   SpO2 97%   BMI 29.92 kg/m²   Current Outpatient Medications on File Prior to Visit   Medication Sig Dispense Refill   • amLODIPine (NORVASC) 5 MG tablet Take 1 tablet by mouth Daily. 90 tablet 1   • ascorbic acid (VITAMIN C) 1000 MG tablet Take 1 tablet by mouth Daily.     • B Complex Vitamins (VITAMIN B COMPLEX) capsule capsule Take 1 capsule by mouth Daily.     • Calcium-Magnesium-Zinc 167-83-8 MG tablet Take 1 tablet by mouth Daily.     • cholecalciferol (VITAMIN D3) 25 MCG (1000 UT) tablet Take 1 tablet by mouth Daily.     • Coenzyme Q10 (Co Q10) 100 MG capsule      • " Garlic 1000 MG capsule Take 1 capsule by mouth Daily.     • hydrALAZINE (APRESOLINE) 10 MG tablet TAKE 1 TABLET BY MOUTH THREE TIMES DAILY 270 tablet 1   • Melatonin 5 MG capsule Take 1 each by mouth 2 (two) times a day. 100 each    • Omega 3-6-9 Fatty Acids (TRIPLE OMEGA-3-6-9 PO) Take 1 capsule by mouth Daily.     • vitamin A 48688 UNIT capsule Take 1 capsule by mouth Daily.       No current facility-administered medications on file prior to visit.        Past Medical History:   Diagnosis Date   • Arrhythmia    • Chest pain    • Diverticulosis    • Enlarged thyroid    • H/O varicose veins     phlebitis   • Heart murmur    • Heart valve disease    • Hyperlipidemia    • Hypertension    • Insomnia    • Irregular heart beat    • Mitral valve prolapse    • Palpitations    • Scoliosis    • Vitamin D deficiency       Past Surgical History:   Procedure Laterality Date   • COLONOSCOPY     • CYST REMOVAL Left     breast  //  benign      Family History   Problem Relation Age of Onset   • Thyroid disease Mother         goiter, removed   • Hypertension Mother            • Heart failure Mother         Heart failed   • Cancer Father         bladder   • Thyroid disease Sister    • Liver cancer Sister    • Thyroid disease Other         goiter, removed   • Diabetes Maternal Uncle    • Sudden death Maternal Uncle    • Cancer Sister    • Hypertension Maternal Uncle         (stroke)      Social History     Socioeconomic History   • Marital status:    Tobacco Use   • Smoking status: Never   • Smokeless tobacco: Never   • Tobacco comments:     never a smoker   Vaping Use   • Vaping Use: Never used   Substance and Sexual Activity   • Alcohol use: No     Comment: caffeine use   • Drug use: No   • Sexual activity: Not Currently     Birth control/protection: Abstinence         Office Visit on 2023   Component Date Value Ref Range Status   • Hemoglobin A1C 2023 6.00 (H)  4.80 - 5.60 % Final   •  Glucose 04/19/2023 71  65 - 99 mg/dL Final   • BUN 04/19/2023 14  8 - 23 mg/dL Final   • Creatinine 04/19/2023 0.79  0.57 - 1.00 mg/dL Final   • Sodium 04/19/2023 140  136 - 145 mmol/L Final   • Potassium 04/19/2023 4.0  3.5 - 5.2 mmol/L Final   • Chloride 04/19/2023 102  98 - 107 mmol/L Final   • CO2 04/19/2023 27.2  22.0 - 29.0 mmol/L Final   • Calcium 04/19/2023 9.4  8.6 - 10.5 mg/dL Final   • Total Protein 04/19/2023 7.3  6.0 - 8.5 g/dL Final   • Albumin 04/19/2023 4.1  3.5 - 5.2 g/dL Final   • ALT (SGPT) 04/19/2023 18  1 - 33 U/L Final   • AST (SGOT) 04/19/2023 21  1 - 32 U/L Final   • Alkaline Phosphatase 04/19/2023 73  39 - 117 U/L Final   • Total Bilirubin 04/19/2023 0.2  0.0 - 1.2 mg/dL Final   • Globulin 04/19/2023 3.2  gm/dL Final   • A/G Ratio 04/19/2023 1.3  g/dL Final   • BUN/Creatinine Ratio 04/19/2023 17.7  7.0 - 25.0 Final   • Anion Gap 04/19/2023 10.8  5.0 - 15.0 mmol/L Final   • eGFR 04/19/2023 84.2  >60.0 mL/min/1.73 Final   • Total Cholesterol 04/19/2023 267 (H)  0 - 200 mg/dL Final   • Triglycerides 04/19/2023 269 (H)  0 - 150 mg/dL Final   • HDL Cholesterol 04/19/2023 49  40 - 60 mg/dL Final   • LDL Cholesterol  04/19/2023 167 (H)  0 - 100 mg/dL Final   • VLDL Cholesterol 04/19/2023 51 (H)  5 - 40 mg/dL Final   • LDL/HDL Ratio 04/19/2023 3.35   Final   • TSH 04/19/2023 2.650  0.270 - 4.200 uIU/mL Final   • 25 Hydroxy, Vitamin D 04/19/2023 35.3  30.0 - 100.0 ng/ml Final   • Magnesium 04/19/2023 2.4  1.6 - 2.4 mg/dL Final   • Iron 04/19/2023 89  37 - 145 mcg/dL Final   • Iron Saturation 04/19/2023 22  20 - 50 % Final   • Transferrin 04/19/2023 273  200 - 360 mg/dL Final   • TIBC 04/19/2023 407  298 - 536 mcg/dL Final   • Folate 04/19/2023 >20.00  4.78 - 24.20 ng/mL Final   • Vitamin B-12 04/19/2023 572  211 - 946 pg/mL Final   • WBC 04/19/2023 5.94  3.40 - 10.80 10*3/mm3 Final   • RBC 04/19/2023 5.20  3.77 - 5.28 10*6/mm3 Final   • Hemoglobin 04/19/2023 15.4  12.0 - 15.9 g/dL Final   •  Hematocrit 04/19/2023 46.3  34.0 - 46.6 % Final   • MCV 04/19/2023 89.0  79.0 - 97.0 fL Final   • MCH 04/19/2023 29.6  26.6 - 33.0 pg Final   • MCHC 04/19/2023 33.3  31.5 - 35.7 g/dL Final   • RDW 04/19/2023 11.8 (L)  12.3 - 15.4 % Final   • RDW-SD 04/19/2023 37.3  37.0 - 54.0 fl Final   • MPV 04/19/2023 10.8  6.0 - 12.0 fL Final   • Platelets 04/19/2023 331  140 - 450 10*3/mm3 Final   • Neutrophil % 04/19/2023 59.2  42.7 - 76.0 % Final   • Lymphocyte % 04/19/2023 33.2  19.6 - 45.3 % Final   • Monocyte % 04/19/2023 4.0 (L)  5.0 - 12.0 % Final   • Eosinophil % 04/19/2023 2.4  0.3 - 6.2 % Final   • Basophil % 04/19/2023 1.0  0.0 - 1.5 % Final   • Immature Grans % 04/19/2023 0.2  0.0 - 0.5 % Final   • Neutrophils, Absolute 04/19/2023 3.52  1.70 - 7.00 10*3/mm3 Final   • Lymphocytes, Absolute 04/19/2023 1.97  0.70 - 3.10 10*3/mm3 Final   • Monocytes, Absolute 04/19/2023 0.24  0.10 - 0.90 10*3/mm3 Final   • Eosinophils, Absolute 04/19/2023 0.14  0.00 - 0.40 10*3/mm3 Final   • Basophils, Absolute 04/19/2023 0.06  0.00 - 0.20 10*3/mm3 Final   • Immature Grans, Absolute 04/19/2023 0.01  0.00 - 0.05 10*3/mm3 Final   • nRBC 04/19/2023 0.0  0.0 - 0.2 /100 WBC Final         Physical Exam  Vitals and nursing note reviewed.   Constitutional:       Appearance: Normal appearance. She is normal weight.   HENT:      Head: Normocephalic and atraumatic.   Cardiovascular:      Rate and Rhythm: Normal rate and regular rhythm.      Pulses: Normal pulses.      Heart sounds: Normal heart sounds. No murmur heard.    No friction rub. No gallop.   Pulmonary:      Effort: Pulmonary effort is normal. No respiratory distress.      Breath sounds: Normal breath sounds. No stridor. No wheezing, rhonchi or rales.   Chest:      Chest wall: No tenderness.   Abdominal:      General: Abdomen is flat. Bowel sounds are normal. There is no distension.      Palpations: Abdomen is soft. There is no mass.      Tenderness: There is no abdominal tenderness.  There is no right CVA tenderness, left CVA tenderness, guarding or rebound.      Hernia: No hernia is present.   Skin:     General: Skin is warm and dry.      Capillary Refill: Capillary refill takes less than 2 seconds.      Coloration: Skin is not jaundiced or pale.   Neurological:      General: No focal deficit present.      Mental Status: She is alert and oriented to person, place, and time. Mental status is at baseline.      Motor: No weakness.      Coordination: Coordination normal.      Gait: Gait normal.   Psychiatric:         Mood and Affect: Mood normal.         Behavior: Behavior normal.         Thought Content: Thought content normal.         Judgment: Judgment normal.          Result Review  Data Reviewed:{ Labs  Result Review  Imaging  Med Tab  Media :23}   I have reviewed this patient's chart.  I have reviewed previous labs, previous imaging, previous medications, and previous encounters with notes that were available in this patient's chart.             Assessment and Plan {CC Problem List  Visit Diagnosis  ROS  Review (Popup)  [facility]  Quality  BestPractice  Medications  SmartSets  SnapShot Encounters  Media :23}   Diagnoses and all orders for this visit:    1. Screening for endocrine, metabolic and immunity disorder (Primary)  -     Comprehensive Metabolic Panel  -     CBC & Differential    2. Low vitamin D level  -     Vitamin D,25-Hydroxy    3. Screening for thyroid disorder  -     TSH    4. Thyroid nodule  -     TSH    5. Mixed hyperlipidemia  -     Lipid Panel    6. Screening for diabetes mellitus  -     Hemoglobin A1c    7. Low magnesium level  -     Magnesium    8. Screening for deficiency anemia  -     Iron and TIBC  -     Vitamin B12 & Folate    9. Renal cyst  -     Ambulatory Referral to Nephrology    10. Thyroid cyst  -     Ambulatory Referral to ENT (Otolaryngology)    11. Primary hypertension      -Fasting labs today.  -Referral to ear nose and throat to  discuss thyroid nodule.  -Referral to nephrology to further investigate renal cyst.  -ER red flags discussed with patient.  -Follow-up in 3 months or sooner if needed.    I spent 40 minutes caring for Tiny on this date of service. This time includes time spent by me in the following activities:preparing for the visit, reviewing tests, obtaining and/or reviewing a separately obtained history, performing a medically appropriate examination and/or evaluation , counseling and educating the patient/family/caregiver, ordering medications, tests, or procedures, referring and communicating with other health care professionals , documenting information in the medical record, independently interpreting results and communicating that information with the patient/family/caregiver and care coordination.    Follow Up {Instructions Charge Capture  Follow-up Communications :23}     Patient was given instructions and counseling regarding her condition or for health maintenance advice. Please see specific information pulled into the AVS (placed there by myself) if appropriate.    Return in about 3 months (around 7/18/2023) for Next scheduled follow up.    MDM  Number of Diagnoses or Management Options  Low magnesium level: established, improving  Low vitamin D level: established, improving  Mixed hyperlipidemia: established, improving  Renal cyst: established, improving  Screening for deficiency anemia: new, needed workup  Screening for diabetes mellitus: new, needed workup  Screening for endocrine, metabolic and immunity disorder: new, needed workup  Screening for thyroid disorder: new, needed workup  Thyroid cyst: established, improving  Thyroid nodule: established, improving     Amount and/or Complexity of Data Reviewed  Clinical lab tests: ordered and reviewed  Tests in the radiology section of CPT®: reviewed  Tests in the medicine section of CPT®: reviewed  Discussion of test results with the performing providers:  no  Decide to obtain previous medical records or to obtain history from someone other than the patient: yes  Obtain history from someone other than the patient: no  Review and summarize past medical records: yes  Discuss the patient with other providers: no  Independent visualization of images, tracings, or specimens: no    Risk of Complications, Morbidity, and/or Mortality  Presenting problems: moderate  Diagnostic procedures: low  Management options: moderate    Patient Progress  Patient progress: stable       BMI is >= 25 and <30. (Overweight) The following options were offered after discussion;: exercise counseling/recommendations and nutrition counseling/recommendations       ANTHONY Baird, FNP-BC

## 2023-04-19 ENCOUNTER — CLINICAL SUPPORT (OUTPATIENT)
Dept: FAMILY MEDICINE CLINIC | Facility: CLINIC | Age: 63
End: 2023-04-19
Payer: OTHER GOVERNMENT

## 2023-04-19 DIAGNOSIS — R09.89 LABILE HYPERTENSION: ICD-10-CM

## 2023-04-19 DIAGNOSIS — E04.1 THYROID NODULE: ICD-10-CM

## 2023-04-19 DIAGNOSIS — E78.00 PURE HYPERCHOLESTEROLEMIA: ICD-10-CM

## 2023-04-19 LAB
BASOPHILS # BLD AUTO: 0.06 10*3/MM3 (ref 0–0.2)
BASOPHILS NFR BLD AUTO: 1 % (ref 0–1.5)
DEPRECATED RDW RBC AUTO: 37.3 FL (ref 37–54)
EOSINOPHIL # BLD AUTO: 0.14 10*3/MM3 (ref 0–0.4)
EOSINOPHIL NFR BLD AUTO: 2.4 % (ref 0.3–6.2)
ERYTHROCYTE [DISTWIDTH] IN BLOOD BY AUTOMATED COUNT: 11.8 % (ref 12.3–15.4)
HCT VFR BLD AUTO: 46.3 % (ref 34–46.6)
HGB BLD-MCNC: 15.4 G/DL (ref 12–15.9)
IMM GRANULOCYTES # BLD AUTO: 0.01 10*3/MM3 (ref 0–0.05)
IMM GRANULOCYTES NFR BLD AUTO: 0.2 % (ref 0–0.5)
LYMPHOCYTES # BLD AUTO: 1.97 10*3/MM3 (ref 0.7–3.1)
LYMPHOCYTES NFR BLD AUTO: 33.2 % (ref 19.6–45.3)
MCH RBC QN AUTO: 29.6 PG (ref 26.6–33)
MCHC RBC AUTO-ENTMCNC: 33.3 G/DL (ref 31.5–35.7)
MCV RBC AUTO: 89 FL (ref 79–97)
MONOCYTES # BLD AUTO: 0.24 10*3/MM3 (ref 0.1–0.9)
MONOCYTES NFR BLD AUTO: 4 % (ref 5–12)
NEUTROPHILS NFR BLD AUTO: 3.52 10*3/MM3 (ref 1.7–7)
NEUTROPHILS NFR BLD AUTO: 59.2 % (ref 42.7–76)
NRBC BLD AUTO-RTO: 0 /100 WBC (ref 0–0.2)
PLATELET # BLD AUTO: 331 10*3/MM3 (ref 140–450)
PMV BLD AUTO: 10.8 FL (ref 6–12)
RBC # BLD AUTO: 5.2 10*6/MM3 (ref 3.77–5.28)
WBC NRBC COR # BLD: 5.94 10*3/MM3 (ref 3.4–10.8)

## 2023-04-19 PROCEDURE — 83036 HEMOGLOBIN GLYCOSYLATED A1C: CPT | Performed by: REGISTERED NURSE

## 2023-04-19 PROCEDURE — 83735 ASSAY OF MAGNESIUM: CPT | Performed by: REGISTERED NURSE

## 2023-04-19 PROCEDURE — 85025 COMPLETE CBC W/AUTO DIFF WBC: CPT | Performed by: REGISTERED NURSE

## 2023-04-19 PROCEDURE — 82746 ASSAY OF FOLIC ACID SERUM: CPT | Performed by: REGISTERED NURSE

## 2023-04-19 PROCEDURE — 84443 ASSAY THYROID STIM HORMONE: CPT | Performed by: REGISTERED NURSE

## 2023-04-19 PROCEDURE — 80053 COMPREHEN METABOLIC PANEL: CPT | Performed by: REGISTERED NURSE

## 2023-04-19 PROCEDURE — 80061 LIPID PANEL: CPT | Performed by: REGISTERED NURSE

## 2023-04-19 PROCEDURE — 36415 COLL VENOUS BLD VENIPUNCTURE: CPT | Performed by: REGISTERED NURSE

## 2023-04-19 PROCEDURE — 82306 VITAMIN D 25 HYDROXY: CPT | Performed by: REGISTERED NURSE

## 2023-04-19 PROCEDURE — 84466 ASSAY OF TRANSFERRIN: CPT | Performed by: REGISTERED NURSE

## 2023-04-19 PROCEDURE — 82607 VITAMIN B-12: CPT | Performed by: REGISTERED NURSE

## 2023-04-19 PROCEDURE — 83540 ASSAY OF IRON: CPT | Performed by: REGISTERED NURSE

## 2023-04-20 LAB
25(OH)D3 SERPL-MCNC: 35.3 NG/ML (ref 30–100)
ALBUMIN SERPL-MCNC: 4.1 G/DL (ref 3.5–5.2)
ALBUMIN/GLOB SERPL: 1.3 G/DL
ALP SERPL-CCNC: 73 U/L (ref 39–117)
ALT SERPL W P-5'-P-CCNC: 18 U/L (ref 1–33)
ANION GAP SERPL CALCULATED.3IONS-SCNC: 10.8 MMOL/L (ref 5–15)
AST SERPL-CCNC: 21 U/L (ref 1–32)
BILIRUB SERPL-MCNC: 0.2 MG/DL (ref 0–1.2)
BUN SERPL-MCNC: 14 MG/DL (ref 8–23)
BUN/CREAT SERPL: 17.7 (ref 7–25)
CALCIUM SPEC-SCNC: 9.4 MG/DL (ref 8.6–10.5)
CHLORIDE SERPL-SCNC: 102 MMOL/L (ref 98–107)
CHOLEST SERPL-MCNC: 267 MG/DL (ref 0–200)
CO2 SERPL-SCNC: 27.2 MMOL/L (ref 22–29)
CREAT SERPL-MCNC: 0.79 MG/DL (ref 0.57–1)
EGFRCR SERPLBLD CKD-EPI 2021: 84.2 ML/MIN/1.73
FOLATE SERPL-MCNC: >20 NG/ML (ref 4.78–24.2)
GLOBULIN UR ELPH-MCNC: 3.2 GM/DL
GLUCOSE SERPL-MCNC: 71 MG/DL (ref 65–99)
HBA1C MFR BLD: 6 % (ref 4.8–5.6)
HDLC SERPL-MCNC: 49 MG/DL (ref 40–60)
IRON 24H UR-MRATE: 89 MCG/DL (ref 37–145)
IRON SATN MFR SERPL: 22 % (ref 20–50)
LDLC SERPL CALC-MCNC: 167 MG/DL (ref 0–100)
LDLC/HDLC SERPL: 3.35 {RATIO}
MAGNESIUM SERPL-MCNC: 2.4 MG/DL (ref 1.6–2.4)
POTASSIUM SERPL-SCNC: 4 MMOL/L (ref 3.5–5.2)
PROT SERPL-MCNC: 7.3 G/DL (ref 6–8.5)
SODIUM SERPL-SCNC: 140 MMOL/L (ref 136–145)
TIBC SERPL-MCNC: 407 MCG/DL (ref 298–536)
TRANSFERRIN SERPL-MCNC: 273 MG/DL (ref 200–360)
TRIGL SERPL-MCNC: 269 MG/DL (ref 0–150)
TSH SERPL DL<=0.05 MIU/L-ACNC: 2.65 UIU/ML (ref 0.27–4.2)
VIT B12 BLD-MCNC: 572 PG/ML (ref 211–946)
VLDLC SERPL-MCNC: 51 MG/DL (ref 5–40)

## 2023-05-17 ENCOUNTER — HOSPITAL ENCOUNTER (OUTPATIENT)
Dept: CARDIOLOGY | Facility: HOSPITAL | Age: 63
Discharge: HOME | End: 2023-05-17
Admitting: REGISTERED NURSE
Payer: OTHER GOVERNMENT

## 2023-05-17 DIAGNOSIS — Z13.6 ENCOUNTER FOR SCREENING FOR VASCULAR DISEASE: ICD-10-CM

## 2023-05-17 LAB
BH CV XLRA MEAS - MID AO DIAM: 1.5 CM
BH CV XLRA MEAS - PAD LEFT ABI PT: 1.1
BH CV XLRA MEAS - PAD LEFT ARM: 123 MMHG
BH CV XLRA MEAS - PAD LEFT LEG PT: 139 MMHG
BH CV XLRA MEAS - PAD RIGHT ABI PT: 1.03
BH CV XLRA MEAS - PAD RIGHT ARM: 126 MMHG
BH CV XLRA MEAS - PAD RIGHT LEG PT: 130 MMHG
BH CV XLRA MEAS LEFT DIST CCA EDV: -18 CM/SEC
BH CV XLRA MEAS LEFT DIST CCA PSV: -59.6 CM/SEC
BH CV XLRA MEAS LEFT ICA/CCA RATIO: 1
BH CV XLRA MEAS LEFT MID CCA PSV: 60 CM/SEC
BH CV XLRA MEAS LEFT MID ICA PSV: 62 CM/SEC
BH CV XLRA MEAS LEFT PROX ICA EDV: 13 CM/SEC
BH CV XLRA MEAS LEFT PROX ICA PSV: 61.5 CM/SEC
BH CV XLRA MEAS RIGHT DIST CCA EDV: -9.9 CM/SEC
BH CV XLRA MEAS RIGHT DIST CCA PSV: -49.7 CM/SEC
BH CV XLRA MEAS RIGHT ICA/CCA RATIO: 1.4
BH CV XLRA MEAS RIGHT MID CCA PSV: 50 CM/SEC
BH CV XLRA MEAS RIGHT MID ICA PSV: 68 CM/SEC
BH CV XLRA MEAS RIGHT PROX ICA EDV: -18.6 CM/SEC
BH CV XLRA MEAS RIGHT PROX ICA PSV: -67.7 CM/SEC
MAXIMAL PREDICTED HEART RATE: 157 BPM
STRESS TARGET HR: 133 BPM

## 2023-05-17 PROCEDURE — 93799 UNLISTED CV SVC/PROCEDURE: CPT

## 2023-05-22 ENCOUNTER — LAB (OUTPATIENT)
Dept: LAB | Facility: HOSPITAL | Age: 63
End: 2023-05-22
Payer: OTHER GOVERNMENT

## 2023-05-22 ENCOUNTER — TRANSCRIBE ORDERS (OUTPATIENT)
Dept: ADMINISTRATIVE | Facility: HOSPITAL | Age: 63
End: 2023-05-22
Payer: OTHER GOVERNMENT

## 2023-05-22 DIAGNOSIS — N28.1 ACQUIRED CYST OF KIDNEY: ICD-10-CM

## 2023-05-22 DIAGNOSIS — I10 ESSENTIAL HYPERTENSION, MALIGNANT: ICD-10-CM

## 2023-05-22 DIAGNOSIS — I10 ESSENTIAL HYPERTENSION, MALIGNANT: Primary | ICD-10-CM

## 2023-05-22 LAB
BACTERIA UR QL AUTO: NORMAL /HPF
BILIRUB UR QL STRIP: NEGATIVE
CLARITY UR: CLEAR
COLOR UR: ABNORMAL
GLUCOSE UR STRIP-MCNC: NEGATIVE MG/DL
HGB UR QL STRIP.AUTO: NEGATIVE
HYALINE CASTS UR QL AUTO: NORMAL /LPF
KETONES UR QL STRIP: NEGATIVE
LEUKOCYTE ESTERASE UR QL STRIP.AUTO: NEGATIVE
NITRITE UR QL STRIP: NEGATIVE
PH UR STRIP.AUTO: 7 [PH] (ref 5–8)
PROT UR QL STRIP: NEGATIVE
RBC # UR STRIP: NORMAL /HPF
REF LAB TEST METHOD: NORMAL
SP GR UR STRIP: 1.01 (ref 1–1.03)
SQUAMOUS #/AREA URNS HPF: NORMAL /HPF
UROBILINOGEN UR QL STRIP: ABNORMAL
WBC # UR STRIP: NORMAL /HPF

## 2023-05-22 PROCEDURE — 81001 URINALYSIS AUTO W/SCOPE: CPT

## 2023-08-10 ENCOUNTER — TELEPHONE (OUTPATIENT)
Dept: FAMILY MEDICINE CLINIC | Facility: CLINIC | Age: 63
End: 2023-08-10

## 2023-08-10 NOTE — TELEPHONE ENCOUNTER
Caller: Tiny Avina    Relationship: Self    Best call back number:[phone]     What orders are you requesting (i.e. lab or imaging): BLOOD WORK    In what timeframe would the patient need to come in: AS SOON AS POSSIBLE    Where will you receive your lab/imaging services: IN OFFICE    Additional notes: CHECK LIPID AND A1C

## 2023-09-05 ENCOUNTER — TELEPHONE (OUTPATIENT)
Dept: FAMILY MEDICINE CLINIC | Facility: CLINIC | Age: 63
End: 2023-09-05
Payer: OTHER GOVERNMENT

## 2023-09-05 DIAGNOSIS — I10 PRIMARY HYPERTENSION: ICD-10-CM

## 2023-09-05 RX ORDER — AMLODIPINE BESYLATE 5 MG/1
5 TABLET ORAL DAILY
Qty: 90 TABLET | Refills: 1 | Status: SHIPPED | OUTPATIENT
Start: 2023-09-05 | End: 2023-09-06 | Stop reason: SDUPTHER

## 2023-09-05 NOTE — TELEPHONE ENCOUNTER
PT CALLED AND REQUESTED REFILLS APPOINTMENT ON 9-6-2023     Rx Refill Note  Requested Prescriptions     Pending Prescriptions Disp Refills    amLODIPine (NORVASC) 5 MG tablet 90 tablet 1     Sig: Take 1 tablet by mouth Daily.      Last office visit with prescribing clinician: 4/18/2023   Last telemedicine visit with prescribing clinician: Visit date not found   Next office visit with prescribing clinician: 9/6/2023                         Would you like a call back once the refill request has been completed: [] Yes [] No    If the office needs to give you a call back, can they leave a voicemail: [] Yes [] No    Alin Loredo Rep  09/05/23, 10:27 EDT

## 2023-09-06 ENCOUNTER — OFFICE VISIT (OUTPATIENT)
Dept: FAMILY MEDICINE CLINIC | Facility: CLINIC | Age: 63
End: 2023-09-06
Payer: OTHER GOVERNMENT

## 2023-09-06 VITALS
HEIGHT: 60 IN | WEIGHT: 135 LBS | HEART RATE: 53 BPM | OXYGEN SATURATION: 96 % | RESPIRATION RATE: 18 BRPM | BODY MASS INDEX: 26.5 KG/M2 | DIASTOLIC BLOOD PRESSURE: 68 MMHG | SYSTOLIC BLOOD PRESSURE: 108 MMHG | TEMPERATURE: 98.4 F

## 2023-09-06 DIAGNOSIS — Z13.0 SCREENING FOR ENDOCRINE, METABOLIC, AND IMMUNITY DISORDER: ICD-10-CM

## 2023-09-06 DIAGNOSIS — Z13.220 SCREENING FOR LIPID DISORDERS: ICD-10-CM

## 2023-09-06 DIAGNOSIS — Z13.228 SCREENING FOR ENDOCRINE, METABOLIC, AND IMMUNITY DISORDER: ICD-10-CM

## 2023-09-06 DIAGNOSIS — Z13.29 SCREENING FOR ENDOCRINE, METABOLIC, AND IMMUNITY DISORDER: ICD-10-CM

## 2023-09-06 DIAGNOSIS — F41.9 ANXIETY: ICD-10-CM

## 2023-09-06 DIAGNOSIS — I10 PRIMARY HYPERTENSION: Primary | ICD-10-CM

## 2023-09-06 DIAGNOSIS — E04.1 THYROID NODULE: ICD-10-CM

## 2023-09-06 DIAGNOSIS — E78.2 MIXED HYPERLIPIDEMIA: ICD-10-CM

## 2023-09-06 PROCEDURE — 83036 HEMOGLOBIN GLYCOSYLATED A1C: CPT | Performed by: REGISTERED NURSE

## 2023-09-06 PROCEDURE — 85025 COMPLETE CBC W/AUTO DIFF WBC: CPT | Performed by: REGISTERED NURSE

## 2023-09-06 PROCEDURE — 80053 COMPREHEN METABOLIC PANEL: CPT | Performed by: REGISTERED NURSE

## 2023-09-06 PROCEDURE — 80061 LIPID PANEL: CPT | Performed by: REGISTERED NURSE

## 2023-09-06 RX ORDER — BUSPIRONE HYDROCHLORIDE 5 MG/1
5 TABLET ORAL 3 TIMES DAILY PRN
Qty: 30 TABLET | Refills: 2 | Status: SHIPPED | OUTPATIENT
Start: 2023-09-06

## 2023-09-06 RX ORDER — AMLODIPINE BESYLATE 5 MG/1
5 TABLET ORAL DAILY
Qty: 90 TABLET | Refills: 1 | Status: SHIPPED | OUTPATIENT
Start: 2023-09-06

## 2023-09-06 NOTE — PROGRESS NOTES
"Chief Complaint  Follow-up (Patient is fasting for labs  today, she has requested them. ), Hypertension, and Med Refill (Amlodipine. )    Subjective    History of Present Illness {CC  Problem List  Visit  Diagnosis   Encounters  Notes  Medications  Labs  Result Review Imaging  Media :23}     Tiny Avina presents to [facility] for Follow-up (Patient is fasting for labs  today, she has requested them. ), Hypertension, and Med Refill (Amlodipine. ).      History of Present Illness  Patient is a 63 y.o. female  presents to the clinic today for 3-month follow-up of hypertension, hyperlipidemia, and new anxiety.  Patient denies any chest pain, shortness of breath, or any fevers.  Patient denies any known exposure to COVID, flu, or any other contagious illnesses.    In regards to anxiety and hypertension, she has been under stress lately due to her brother being ill in the [facility] and the time difference. She takes her blood pressure at home and revealed that one day when she was high stressed her blood pressure was 175/90's and would not come down for a couple of days. She is inquiring about medication she can take only when she needs for her anxiety and high stress situation involving family matters. Her anxiety is accompanied with \"in the moment agitation\" and sleep deprivation. However, she does contribute the sleep disturbances to the time change in the [facility].  Patient's blood pressure at today's visit is well controlled at 108/68.  She would like something that she can take intermittently to help during the with moments of high blood pressure and high anxiety.  We discussed BuSpar as an option and patient is agreeable to this.    In regards to hyperlipidemia, patient does try to watch her fat intake when possible.  She takes omega-3 to help balance her cholesterol levels.  She has no concerns in regards to hyperlipidemia at this time.    In regards to medical " management, she currently is requesting a 90 day supply of amlodipine, BP today was 108/68 which she states is low for her. She would also like to get a CBC, CMP, Ha1c, and lipid panel. She was concerned about her lipids on the last visit as she admits her triglycerides, total cholesterol, and LDL were all elevated. She has been watching what she eats and trying to incorporate more healthy foods such as lean means and complex carbs into her diet. She has been walking on the treadmill as well in hopes to avoid cholesterol medication.        Review of Systems   Constitutional: Negative.  Negative for activity change, chills, fatigue and fever.   HENT: Negative.  Negative for congestion, dental problem, ear pain, hearing loss, rhinorrhea, sinus pain, sore throat, tinnitus and trouble swallowing.    Eyes: Negative.  Negative for pain and visual disturbance.   Respiratory: Negative.  Negative for cough, chest tightness, shortness of breath and wheezing.    Cardiovascular: Negative.  Negative for chest pain, palpitations and leg swelling.   Gastrointestinal: Negative.  Negative for abdominal pain, diarrhea, nausea and vomiting.   Endocrine: Negative.  Negative for polydipsia, polyphagia and polyuria.   Genitourinary: Negative.  Negative for difficulty urinating, dysuria, frequency and urgency.   Musculoskeletal: Negative.  Negative for arthralgias, back pain and myalgias.   Skin: Negative.  Negative for color change, pallor, rash and wound.   Allergic/Immunologic: Negative.  Negative for environmental allergies.   Neurological: Negative.  Negative for dizziness, speech difficulty, weakness, light-headedness, numbness and headaches.   Hematological: Negative.    Psychiatric/Behavioral:  Positive for agitation (Due to brother's current condition) and sleep disturbance. Negative for confusion, decreased concentration, self-injury and suicidal ideas. The patient is nervous/anxious.    All other systems reviewed and are  "negative.     Objective     Vital Signs:   /68 (BP Location: Left arm, Patient Position: Sitting, Cuff Size: Adult)   Pulse 53   Temp 98.4 °F (36.9 °C) (Oral)   Resp 18   Ht 152.4 cm (60\")   Wt 61.2 kg (135 lb)   SpO2 96%   BMI 26.37 kg/m²     Current Outpatient Medications on File Prior to Visit   Medication Sig Dispense Refill    ascorbic acid (VITAMIN C) 1000 MG tablet Take 1 tablet by mouth Daily.      B Complex Vitamins (VITAMIN B COMPLEX) capsule capsule Take 1 capsule by mouth Daily.      Calcium-Magnesium-Zinc 167-83-8 MG tablet Take 1 tablet by mouth Daily.      cholecalciferol (VITAMIN D3) 25 MCG (1000 UT) tablet Take 1 tablet by mouth Daily.      Coenzyme Q10 (Co Q10) 100 MG capsule       Garlic 1000 MG capsule Take 1 capsule by mouth Daily.      hydrALAZINE (APRESOLINE) 10 MG tablet TAKE 1 TABLET BY MOUTH THREE TIMES DAILY 270 tablet 1    Melatonin 5 MG capsule Take 1 each by mouth 2 (two) times a day. 100 each     Omega 3-6-9 Fatty Acids (TRIPLE OMEGA-3-6-9 PO) Take 1 capsule by mouth Daily.      vitamin A 75737 UNIT capsule Take 1 capsule by mouth Daily.      [DISCONTINUED] amLODIPine (NORVASC) 5 MG tablet Take 1 tablet by mouth Daily. 90 tablet 1     No current facility-administered medications on file prior to visit.        Past Medical History:   Diagnosis Date    Arrhythmia     Chest pain     Diverticulosis     Enlarged thyroid     H/O varicose veins     phlebitis    Heart murmur     Heart valve disease     Hyperlipidemia     Hypertension     Insomnia     Irregular heart beat     Mitral valve prolapse     Palpitations     Scoliosis     Vitamin D deficiency       Past Surgical History:   Procedure Laterality Date    COLONOSCOPY      CYST REMOVAL Left     breast  //  benign      Family History   Problem Relation Age of Onset    Thyroid disease Mother         goiter, removed    Hypertension Mother             Heart failure Mother         Heart failed    Cancer Father       "   bladder    Thyroid disease Sister     Liver cancer Sister     Thyroid disease Other         goiter, removed    Diabetes Maternal Uncle     Sudden death Maternal Uncle     Cancer Sister     Hypertension Maternal Uncle         (stroke)      Social History     Socioeconomic History    Marital status:    Tobacco Use    Smoking status: Never    Smokeless tobacco: Never    Tobacco comments:     never a smoker   Vaping Use    Vaping Use: Never used   Substance and Sexual Activity    Alcohol use: No     Comment: caffeine use    Drug use: No    Sexual activity: Not Currently     Birth control/protection: Abstinence         No visits with results within 3 Month(s) from this visit.   Latest known visit with results is:   Lab on 2023   Component Date Value Ref Range Status    Color, UA 2023 Dark Yellow (A)  Yellow, Straw Final    Appearance, UA 2023 Clear  Clear Final    pH, UA 2023 7.0  5.0 - 8.0 Final    Specific Gravity, UA 2023 1.010  1.005 - 1.030 Final    Glucose, UA 2023 Negative  Negative Final    Ketones, UA 2023 Negative  Negative Final    Bilirubin, UA 2023 Negative  Negative Final    Blood, UA 2023 Negative  Negative Final    Protein, UA 2023 Negative  Negative Final    Leuk Esterase, UA 2023 Negative  Negative Final    Nitrite, UA 2023 Negative  Negative Final    Urobilinogen, UA 2023 0.2 E.U./dL  0.2 - 1.0 E.U./dL Final    RBC, UA 2023 0-2  None Seen, 0-2 /HPF Final    WBC, UA 2023 0-2  None Seen, 0-2 /HPF Final    Bacteria, UA 2023 None Seen  None Seen /HPF Final    Squamous Epithelial Cells, UA 2023 0-2  None Seen, 0-2 /HPF Final    Hyaline Casts, UA 2023 None Seen  None Seen /LPF Final    Methodology 2023 Automated Microscopy   Final         Physical Exam  Vitals and nursing note reviewed.   Constitutional:       Appearance: Normal appearance. She is normal weight.   HENT:       Head: Normocephalic and atraumatic.      Nose: Nose normal.   Eyes:      Extraocular Movements: Extraocular movements intact.   Cardiovascular:      Rate and Rhythm: Normal rate and regular rhythm.      Pulses: Normal pulses.      Heart sounds: Normal heart sounds. No murmur heard.    No friction rub. No gallop.   Pulmonary:      Effort: Pulmonary effort is normal. No respiratory distress.      Breath sounds: Normal breath sounds. No stridor. No wheezing, rhonchi or rales.   Chest:      Chest wall: No tenderness.   Abdominal:      General: Abdomen is flat. Bowel sounds are normal. There is no distension.      Palpations: Abdomen is soft. There is no mass.      Tenderness: There is no abdominal tenderness. There is no right CVA tenderness, left CVA tenderness, guarding or rebound.      Hernia: No hernia is present.   Musculoskeletal:      Cervical back: Normal range of motion.   Skin:     General: Skin is warm and dry.      Capillary Refill: Capillary refill takes less than 2 seconds.      Coloration: Skin is not jaundiced or pale.   Neurological:      General: No focal deficit present.      Mental Status: She is alert and oriented to person, place, and time. Mental status is at baseline.      Motor: No weakness.      Coordination: Coordination normal.      Gait: Gait normal.   Psychiatric:         Mood and Affect: Mood normal.         Behavior: Behavior normal.         Thought Content: Thought content normal.         Judgment: Judgment normal.        Result Review  Data Reviewed:{ Labs  Result Review  Imaging  Med Tab  Media :23}   I have reviewed this patient's chart.  I have reviewed previous labs, previous imaging, previous medications, and previous encounters with notes that were available in this patient's chart.         Assessment and Plan {CC Problem List  Visit Diagnosis  ROS  Review (Popup)  [facility]  Quality  BestPractice  Medications  SmartSets  SnapShot Encounters  Media :23}    Diagnoses and all orders for this visit:    1. Primary hypertension (Primary)  -     amLODIPine (NORVASC) 5 MG tablet; Take 1 tablet by mouth Daily.  Dispense: 90 tablet; Refill: 1  -     CBC & Differential    2. Screening for endocrine, metabolic, and immunity disorder  -     Comprehensive Metabolic Panel  -     Hemoglobin A1c    3. Screening for lipid disorders  -     Lipid Panel    4. Anxiety  -     busPIRone (BUSPAR) 5 MG tablet; Take 1 tablet by mouth 3 (Three) Times a Day As Needed (anxiety).  Dispense: 30 tablet; Refill: 2    5. Thyroid nodule    6. Mixed hyperlipidemia        -Refill 90 day supply of amlodipine.  -prescribe Buspar 5mg TID PRN for anxiety. Side effects discussed with patient  -Labs ordered  -Diet and exercise education on lowering BMI and lipids discussed   -ER red flags discussed with patient including risk versus benefit and education provided.  -Follow-up with me in 3-6 months or sooner if you need anything while in [place].     I spent 40 minutes caring for Tiny on this date of service. This time includes time spent by me in the following activities:preparing for the visit, reviewing tests, obtaining and/or reviewing a separately obtained history, performing a medically appropriate examination and/or evaluation , counseling and educating the patient/family/caregiver, ordering medications, tests, or procedures, referring and communicating with other health care professionals , documenting information in the medical record, independently interpreting results and communicating that information with the patient/family/caregiver, and care coordination.    Follow Up {Instructions Charge Capture  Follow-up Communications :23}     Patient was given instructions and counseling regarding her condition or for health maintenance advice. Please see specific information pulled into the AVS (placed there by myself) if appropriate.    Return in about 6 months (around 3/6/2024) for Recheck.             ANTHONY Baird, FNP-BC

## 2023-09-06 NOTE — PROGRESS NOTES
Venipuncture Blood Specimen Collection  Venipuncture performed in R ARM by Jeanne Chung MA with good hemostasis. Patient tolerated the procedure well without complications.   09/06/23   Jeanne Chung MA

## 2023-09-07 LAB
ALBUMIN SERPL-MCNC: 4.3 G/DL (ref 3.5–5.2)
ALBUMIN/GLOB SERPL: 1.5 G/DL
ALP SERPL-CCNC: 75 U/L (ref 39–117)
ALT SERPL W P-5'-P-CCNC: 14 U/L (ref 1–33)
ANION GAP SERPL CALCULATED.3IONS-SCNC: 11 MMOL/L (ref 5–15)
AST SERPL-CCNC: 23 U/L (ref 1–32)
BASOPHILS # BLD AUTO: 0.06 10*3/MM3 (ref 0–0.2)
BASOPHILS NFR BLD AUTO: 1.1 % (ref 0–1.5)
BILIRUB SERPL-MCNC: 0.4 MG/DL (ref 0–1.2)
BUN SERPL-MCNC: 10 MG/DL (ref 8–23)
BUN/CREAT SERPL: 13.3 (ref 7–25)
CALCIUM SPEC-SCNC: 9.5 MG/DL (ref 8.6–10.5)
CHLORIDE SERPL-SCNC: 107 MMOL/L (ref 98–107)
CHOLEST SERPL-MCNC: 231 MG/DL (ref 0–200)
CO2 SERPL-SCNC: 27 MMOL/L (ref 22–29)
CREAT SERPL-MCNC: 0.75 MG/DL (ref 0.57–1)
DEPRECATED RDW RBC AUTO: 41 FL (ref 37–54)
EGFRCR SERPLBLD CKD-EPI 2021: 89.6 ML/MIN/1.73
EOSINOPHIL # BLD AUTO: 0.14 10*3/MM3 (ref 0–0.4)
EOSINOPHIL NFR BLD AUTO: 2.5 % (ref 0.3–6.2)
ERYTHROCYTE [DISTWIDTH] IN BLOOD BY AUTOMATED COUNT: 12.1 % (ref 12.3–15.4)
GLOBULIN UR ELPH-MCNC: 2.9 GM/DL
GLUCOSE SERPL-MCNC: 85 MG/DL (ref 65–99)
HBA1C MFR BLD: 5.8 % (ref 4.8–5.6)
HCT VFR BLD AUTO: 43.9 % (ref 34–46.6)
HDLC SERPL-MCNC: 55 MG/DL (ref 40–60)
HGB BLD-MCNC: 14.2 G/DL (ref 12–15.9)
IMM GRANULOCYTES # BLD AUTO: 0.01 10*3/MM3 (ref 0–0.05)
IMM GRANULOCYTES NFR BLD AUTO: 0.2 % (ref 0–0.5)
LDLC SERPL CALC-MCNC: 152 MG/DL (ref 0–100)
LDLC/HDLC SERPL: 2.72 {RATIO}
LYMPHOCYTES # BLD AUTO: 2.18 10*3/MM3 (ref 0.7–3.1)
LYMPHOCYTES NFR BLD AUTO: 39.6 % (ref 19.6–45.3)
MCH RBC QN AUTO: 29.7 PG (ref 26.6–33)
MCHC RBC AUTO-ENTMCNC: 32.3 G/DL (ref 31.5–35.7)
MCV RBC AUTO: 91.8 FL (ref 79–97)
MONOCYTES # BLD AUTO: 0.26 10*3/MM3 (ref 0.1–0.9)
MONOCYTES NFR BLD AUTO: 4.7 % (ref 5–12)
NEUTROPHILS NFR BLD AUTO: 2.85 10*3/MM3 (ref 1.7–7)
NEUTROPHILS NFR BLD AUTO: 51.9 % (ref 42.7–76)
NRBC BLD AUTO-RTO: 0 /100 WBC (ref 0–0.2)
PLATELET # BLD AUTO: 293 10*3/MM3 (ref 140–450)
PMV BLD AUTO: 11.5 FL (ref 6–12)
POTASSIUM SERPL-SCNC: 4.3 MMOL/L (ref 3.5–5.2)
PROT SERPL-MCNC: 7.2 G/DL (ref 6–8.5)
RBC # BLD AUTO: 4.78 10*6/MM3 (ref 3.77–5.28)
SODIUM SERPL-SCNC: 145 MMOL/L (ref 136–145)
TRIGL SERPL-MCNC: 133 MG/DL (ref 0–150)
VLDLC SERPL-MCNC: 24 MG/DL (ref 5–40)
WBC NRBC COR # BLD: 5.5 10*3/MM3 (ref 3.4–10.8)

## 2023-10-16 ENCOUNTER — OFFICE VISIT (OUTPATIENT)
Dept: FAMILY MEDICINE CLINIC | Facility: CLINIC | Age: 63
End: 2023-10-16
Payer: OTHER GOVERNMENT

## 2023-10-16 ENCOUNTER — TELEPHONE (OUTPATIENT)
Dept: FAMILY MEDICINE CLINIC | Facility: CLINIC | Age: 63
End: 2023-10-16

## 2023-10-16 VITALS
RESPIRATION RATE: 18 BRPM | WEIGHT: 138 LBS | OXYGEN SATURATION: 96 % | HEART RATE: 72 BPM | TEMPERATURE: 98.4 F | HEIGHT: 60 IN | SYSTOLIC BLOOD PRESSURE: 114 MMHG | BODY MASS INDEX: 27.09 KG/M2 | DIASTOLIC BLOOD PRESSURE: 72 MMHG

## 2023-10-16 DIAGNOSIS — G47.33 OBSTRUCTIVE SLEEP APNEA SYNDROME: Primary | ICD-10-CM

## 2023-10-16 DIAGNOSIS — R06.83 SNORING: ICD-10-CM

## 2023-10-16 PROCEDURE — 99213 OFFICE O/P EST LOW 20 MIN: CPT | Performed by: REGISTERED NURSE

## 2023-10-16 RX ORDER — MAGNESIUM OXIDE 400 MG/1
400 TABLET ORAL DAILY
COMMUNITY

## 2023-10-16 NOTE — PROGRESS NOTES
Chief Complaint  Hypertension (Patient states that her blood pressure keeps  running high, and  then goes down. She states that she has been taking her blood pressure medications like she  is supposed  to and she also takes  vitamins to help with it as well and  she  is  concerned. )    Subjective    History of Present Illness {CC  Problem List  Visit  Diagnosis   Encounters  Notes  Medications  Labs  Result Review Imaging  Media :23}     Tiny Avina presents to [facility] for Hypertension (Patient states that her blood pressure keeps  running high, and  then goes down. She states that she has been taking her blood pressure medications like she  is supposed  to and she also takes  vitamins to help with it as well and  she  is  concerned. ).      History of Present Illness  Patient is a 63 y.o. female  presents to the clinic today for concerns of worsening sleep apnea.  Patient denies any chest pain, shortness of breath, or any fevers.  Patient denies any known exposure to COVID, flu, or any other contagious illnesses.    In regards to sleep apnea, patient had a sleep study in March but patient has not been able to receive communication or orders from sleep physician.  I received the report and it states that patient needs CPAP.  No orders and recommendations were made.  Patient would like a new referral and prefers [facility] if possible.  This referral has been placed today.       Review of Systems   Constitutional: Negative.  Negative for activity change, chills, fatigue and fever.   HENT: Negative.  Negative for congestion, dental problem, ear pain, hearing loss, rhinorrhea, sinus pain, sore throat, tinnitus and trouble swallowing.    Eyes: Negative.  Negative for pain and visual disturbance.   Respiratory: Negative.  Negative for cough, chest tightness, shortness of breath and wheezing.    Cardiovascular: Negative.  Negative for chest pain, palpitations and leg swelling.  "  Gastrointestinal: Negative.  Negative for abdominal pain, diarrhea, nausea and vomiting.   Endocrine: Negative.  Negative for polydipsia, polyphagia and polyuria.   Genitourinary: Negative.  Negative for difficulty urinating, dysuria, frequency and urgency.   Musculoskeletal: Negative.  Negative for arthralgias, back pain and myalgias.   Skin: Negative.  Negative for color change, pallor, rash and wound.   Allergic/Immunologic: Negative.  Negative for environmental allergies.   Neurological: Negative.  Negative for dizziness, speech difficulty, weakness, light-headedness, numbness and headaches.   Hematological: Negative.    Psychiatric/Behavioral:  Positive for sleep disturbance (Significant snoring that interrupts sleep). Negative for confusion, decreased concentration, self-injury and suicidal ideas. The patient is not nervous/anxious.    All other systems reviewed and are negative.       Objective     Vital Signs:   /72 (BP Location: Left arm, Patient Position: Sitting, Cuff Size: Adult)   Pulse 72   Temp 98.4 °F (36.9 °C) (Oral)   Resp 18   Ht 152.4 cm (60\")   Wt 62.6 kg (138 lb)   SpO2 96%   BMI 26.95 kg/m²   Current Outpatient Medications on File Prior to Visit   Medication Sig Dispense Refill    amLODIPine (NORVASC) 5 MG tablet Take 1 tablet by mouth Daily. 90 tablet 1    ascorbic acid (VITAMIN C) 1000 MG tablet Take 1 tablet by mouth Daily.      B Complex Vitamins (VITAMIN B COMPLEX) capsule capsule Take 1 capsule by mouth Daily.      busPIRone (BUSPAR) 5 MG tablet Take 1 tablet by mouth 3 (Three) Times a Day As Needed (anxiety). 30 tablet 2    cholecalciferol (VITAMIN D3) 25 MCG (1000 UT) tablet Take 1 tablet by mouth Daily.      Coenzyme Q10 (Co Q10) 100 MG capsule       diphenhydrAMINE-acetaminophen (TYLENOL PM)  MG tablet per tablet Take 1 tablet by mouth At Night As Needed for Sleep.      Garlic 1000 MG capsule Take 1 capsule by mouth Daily.      hydrALAZINE (APRESOLINE) 10 MG " tablet TAKE 1 TABLET BY MOUTH THREE TIMES DAILY 270 tablet 1    magnesium oxide (MAG-OX) 400 MG tablet Take 1 tablet by mouth Daily.      Omega 3-6-9 Fatty Acids (TRIPLE OMEGA-3-6-9 PO) Take 1 capsule by mouth Daily.      vitamin A 76044 UNIT capsule Take 1 capsule by mouth Daily.      Calcium-Magnesium-Zinc 167-83-8 MG tablet Take 1 tablet by mouth Daily.      Melatonin 5 MG capsule Take 1 each by mouth 2 (two) times a day. (Patient not taking: Reported on 10/16/2023) 100 each      No current facility-administered medications on file prior to visit.        Past Medical History:   Diagnosis Date    Arrhythmia     Arthritis     osteoarthritis    Chest pain     Diverticulosis     Enlarged thyroid     H/O varicose veins     phlebitis    Heart murmur     Heart valve disease     Hyperlipidemia     Hypertension     Insomnia     Irregular heart beat     Mitral valve prolapse     Palpitations     Scoliosis     Vitamin D deficiency       Past Surgical History:   Procedure Laterality Date    COLONOSCOPY      CYST REMOVAL Left     breast  //  benign      Family History   Problem Relation Age of Onset    Thyroid disease Mother             Hypertension Mother          at 93yo    Heart failure Mother         Heart failed    Cancer Father         lung ()    Thyroid disease Sister     Liver cancer Sister     Cancer Sister         Liver ()    Thyroid disease Sister         taking meds    Cancer Sister         Breast(Living)    Diabetes Maternal Aunt             Diabetes Maternal Uncle     Sudden death Maternal Uncle     Hypertension Maternal Uncle         (stroke)    Thyroid disease Other         goiter, removed    Sleep apnea Neg Hx       Social History     Socioeconomic History    Marital status:    Tobacco Use    Smoking status: Never    Smokeless tobacco: Never    Tobacco comments:     never a smoker   Vaping Use    Vaping Use: Never used   Substance and Sexual  Activity    Alcohol use: No     Comment: caffeine use    Drug use: No    Sexual activity: Not Currently     Birth control/protection: Abstinence         Office Visit on 09/06/2023   Component Date Value Ref Range Status    Glucose 09/06/2023 85  65 - 99 mg/dL Final    BUN 09/06/2023 10  8 - 23 mg/dL Final    Creatinine 09/06/2023 0.75  0.57 - 1.00 mg/dL Final    Sodium 09/06/2023 145  136 - 145 mmol/L Final    Potassium 09/06/2023 4.3  3.5 - 5.2 mmol/L Final    Chloride 09/06/2023 107  98 - 107 mmol/L Final    CO2 09/06/2023 27.0  22.0 - 29.0 mmol/L Final    Calcium 09/06/2023 9.5  8.6 - 10.5 mg/dL Final    Total Protein 09/06/2023 7.2  6.0 - 8.5 g/dL Final    Albumin 09/06/2023 4.3  3.5 - 5.2 g/dL Final    ALT (SGPT) 09/06/2023 14  1 - 33 U/L Final    AST (SGOT) 09/06/2023 23  1 - 32 U/L Final    Alkaline Phosphatase 09/06/2023 75  39 - 117 U/L Final    Total Bilirubin 09/06/2023 0.4  0.0 - 1.2 mg/dL Final    Globulin 09/06/2023 2.9  gm/dL Final    A/G Ratio 09/06/2023 1.5  g/dL Final    BUN/Creatinine Ratio 09/06/2023 13.3  7.0 - 25.0 Final    Anion Gap 09/06/2023 11.0  5.0 - 15.0 mmol/L Final    eGFR 09/06/2023 89.6  >60.0 mL/min/1.73 Final    Total Cholesterol 09/06/2023 231 (H)  0 - 200 mg/dL Final    Triglycerides 09/06/2023 133  0 - 150 mg/dL Final    HDL Cholesterol 09/06/2023 55  40 - 60 mg/dL Final    LDL Cholesterol  09/06/2023 152 (H)  0 - 100 mg/dL Final    VLDL Cholesterol 09/06/2023 24  5 - 40 mg/dL Final    LDL/HDL Ratio 09/06/2023 2.72   Final    Hemoglobin A1C 09/06/2023 5.80 (H)  4.80 - 5.60 % Final    WBC 09/06/2023 5.50  3.40 - 10.80 10*3/mm3 Final    RBC 09/06/2023 4.78  3.77 - 5.28 10*6/mm3 Final    Hemoglobin 09/06/2023 14.2  12.0 - 15.9 g/dL Final    Hematocrit 09/06/2023 43.9  34.0 - 46.6 % Final    MCV 09/06/2023 91.8  79.0 - 97.0 fL Final    MCH 09/06/2023 29.7  26.6 - 33.0 pg Final    MCHC 09/06/2023 32.3  31.5 - 35.7 g/dL Final    RDW 09/06/2023 12.1 (L)  12.3 - 15.4 % Final    RDW-SD  09/06/2023 41.0  37.0 - 54.0 fl Final    MPV 09/06/2023 11.5  6.0 - 12.0 fL Final    Platelets 09/06/2023 293  140 - 450 10*3/mm3 Final    Neutrophil % 09/06/2023 51.9  42.7 - 76.0 % Final    Lymphocyte % 09/06/2023 39.6  19.6 - 45.3 % Final    Monocyte % 09/06/2023 4.7 (L)  5.0 - 12.0 % Final    Eosinophil % 09/06/2023 2.5  0.3 - 6.2 % Final    Basophil % 09/06/2023 1.1  0.0 - 1.5 % Final    Immature Grans % 09/06/2023 0.2  0.0 - 0.5 % Final    Neutrophils, Absolute 09/06/2023 2.85  1.70 - 7.00 10*3/mm3 Final    Lymphocytes, Absolute 09/06/2023 2.18  0.70 - 3.10 10*3/mm3 Final    Monocytes, Absolute 09/06/2023 0.26  0.10 - 0.90 10*3/mm3 Final    Eosinophils, Absolute 09/06/2023 0.14  0.00 - 0.40 10*3/mm3 Final    Basophils, Absolute 09/06/2023 0.06  0.00 - 0.20 10*3/mm3 Final    Immature Grans, Absolute 09/06/2023 0.01  0.00 - 0.05 10*3/mm3 Final    nRBC 09/06/2023 0.0  0.0 - 0.2 /100 WBC Final         Physical Exam  Vitals and nursing note reviewed.   Constitutional:       Appearance: Normal appearance. She is normal weight.   HENT:      Head: Normocephalic and atraumatic.   Cardiovascular:      Rate and Rhythm: Normal rate and regular rhythm.      Pulses: Normal pulses.      Heart sounds: Normal heart sounds. No murmur heard.     No friction rub. No gallop.   Pulmonary:      Effort: Pulmonary effort is normal. No respiratory distress.      Breath sounds: Normal breath sounds. No stridor. No wheezing, rhonchi or rales.   Chest:      Chest wall: No tenderness.   Abdominal:      General: Abdomen is flat. Bowel sounds are normal. There is no distension.      Palpations: Abdomen is soft. There is no mass.      Tenderness: There is no abdominal tenderness. There is no right CVA tenderness, left CVA tenderness, guarding or rebound.      Hernia: No hernia is present.   Skin:     General: Skin is warm and dry.      Capillary Refill: Capillary refill takes less than 2 seconds.      Coloration: Skin is not jaundiced or  pale.   Neurological:      General: No focal deficit present.      Mental Status: She is alert and oriented to person, place, and time. Mental status is at baseline.      Motor: No weakness.      Coordination: Coordination normal.      Gait: Gait normal.   Psychiatric:         Mood and Affect: Mood normal.         Behavior: Behavior normal.         Thought Content: Thought content normal.         Judgment: Judgment normal.          Result Review  Data Reviewed:{ Labs  Result Review  Imaging  Med Tab  Media :23}   I have reviewed this patient's chart.  I have reviewed previous labs, previous imaging, previous medications, and previous encounters with notes that were available in this patient's chart.               Assessment and Plan {CC Problem List  Visit Diagnosis  ROS  Review (Popup)  [facility]  Quality  BestPractice  Medications  SmartSets  SnapShot Encounters  Media :23}   Diagnoses and all orders for this visit:    1. Obstructive sleep apnea syndrome (Primary)  -     Ambulatory Referral to Sleep Medicine    2. Snoring        -Referral to sleep medicine, preferably [facility] so the patient can get orders for CPAP machine.  -ER red flags discussed with patient including risk versus benefit and education provided.  -Follow-up with me in 3 months at next routine visit.    I spent 20 minutes caring for Tiny on this date of service. This time includes time spent by me in the following activities:preparing for the visit, reviewing tests, obtaining and/or reviewing a separately obtained history, performing a medically appropriate examination and/or evaluation , counseling and educating the patient/family/caregiver, ordering medications, tests, or procedures, referring and communicating with other health care professionals , documenting information in the medical record, independently interpreting results and communicating that information with the patient/family/caregiver, and care  coordination.    Follow Up {Instructions Charge Capture  Follow-up Communications :23}     Patient was given instructions and counseling regarding her condition or for health maintenance advice. Please see specific information pulled into the AVS (placed there by myself) if appropriate.    Return in about 3 months (around 1/16/2024) for routine follow up.            ANTHONY Baird, FNP-BC

## 2023-10-16 NOTE — TELEPHONE ENCOUNTER
Caller: Tiny Avina    Relationship: Self    Best call back number: [phone]    What orders are you requesting (i.e. lab or imaging): [facility]    In what timeframe would the patient need to come in: ASAP      Additional notes: SHE WOULD LIKE TO GET A [facility] OF HER BLOOD VESSELS TO SEE WHY HER BLOOD PRESSURE IS REMAINING SO HIGH.

## 2023-10-30 ENCOUNTER — OFFICE VISIT (OUTPATIENT)
Dept: SLEEP MEDICINE | Facility: CLINIC | Age: 63
End: 2023-10-30
Payer: OTHER GOVERNMENT

## 2023-10-30 VITALS
SYSTOLIC BLOOD PRESSURE: 127 MMHG | OXYGEN SATURATION: 98 % | DIASTOLIC BLOOD PRESSURE: 60 MMHG | HEIGHT: 59 IN | WEIGHT: 135 LBS | HEART RATE: 50 BPM | BODY MASS INDEX: 27.21 KG/M2 | RESPIRATION RATE: 12 BRPM

## 2023-10-30 DIAGNOSIS — G47.00 INSOMNIA, UNSPECIFIED TYPE: ICD-10-CM

## 2023-10-30 DIAGNOSIS — G47.33 OBSTRUCTIVE SLEEP APNEA: Primary | ICD-10-CM

## 2023-10-30 DIAGNOSIS — G47.8 NON-RESTORATIVE SLEEP: ICD-10-CM

## 2023-10-30 DIAGNOSIS — E66.3 OVERWEIGHT WITH BODY MASS INDEX (BMI) 25.0-29.9: ICD-10-CM

## 2023-10-30 DIAGNOSIS — G47.10 HYPERSOMNIA: ICD-10-CM

## 2023-10-30 NOTE — PROGRESS NOTES
[facility] New Patient/Consultation       Reason for Consultation: ever      Patient Care Team:  Jakob Cohen APRN as PCP - General (Nurse Practitioner)  Merced Solorzano MD as Obstetrician (Obstetrics and Gynecology)  Yuri Cabrales MD as Consulting Physician (Sleep Medicine)      History of present illness:  Thank you for asking me to see your patient.  The patient is a 63 y.o. female presents today to [facility] care.  Had a home sleep study ordered by her primary care provider earlier this year; reviewed results which showed overall AHI of 8.4 events per hour lowest SPO2 of 85%.  Patient presents today to discuss results and treatment options.  Patient reports sleeps around 6 hours a night sleep latency 1.5 to 2 hours 0 naps no rotating shifts.  Reports waking up gasping for breath hypersomnia nonrestorative sleep nocturia difficulty staying asleep at night.  BMI 27.3.      Social History: No tobacco alcohol or drug use sometimes has caffeine    Allergies:  Asa [aspirin]    Family History: EVER no       Current Outpatient Medications:     amLODIPine (NORVASC) 5 MG tablet, Take 1 tablet by mouth Daily., Disp: 90 tablet, Rfl: 1    ascorbic acid (VITAMIN C) 1000 MG tablet, Take 1 tablet by mouth Daily., Disp: , Rfl:     B Complex Vitamins (VITAMIN B COMPLEX) capsule capsule, Take 1 capsule by mouth Daily., Disp: , Rfl:     busPIRone (BUSPAR) 5 MG tablet, Take 1 tablet by mouth 3 (Three) Times a Day As Needed (anxiety)., Disp: 30 tablet, Rfl: 2    Calcium-Magnesium-Zinc 167-83-8 MG tablet, Take 1 tablet by mouth Daily., Disp: , Rfl:     cholecalciferol (VITAMIN D3) 25 MCG (1000 UT) tablet, Take 1 tablet by mouth Daily., Disp: , Rfl:     Coenzyme Q10 (Co Q10) 100 MG capsule, , Disp: , Rfl:     diphenhydrAMINE-acetaminophen (TYLENOL PM)  MG tablet per tablet, Take 1 tablet by mouth At Night As Needed for Sleep., Disp: , Rfl:     Garlic 1000 MG capsule, Take 1 capsule by mouth Daily., Disp: ,  "Rfl:     hydrALAZINE (APRESOLINE) 10 MG tablet, TAKE 1 TABLET BY MOUTH THREE TIMES DAILY, Disp: 270 tablet, Rfl: 1    magnesium oxide (MAG-OX) 400 MG tablet, Take 1 tablet by mouth Daily., Disp: , Rfl:     Omega 3-6-9 Fatty Acids (TRIPLE OMEGA-3-6-9 PO), Take 1 capsule by mouth Daily., Disp: , Rfl:     vitamin A 80095 UNIT capsule, Take 1 capsule by mouth Daily., Disp: , Rfl:     Melatonin 5 MG capsule, Take 1 each by mouth 2 (two) times a day. (Patient not taking: Reported on 10/16/2023), Disp: 100 each, Rfl:     Vital Signs:    Vitals:    10/30/23 1100   BP: 127/60   BP Location: Right arm   Patient Position: Sitting   Pulse: 50   Resp: 12   SpO2: 98%   Weight: 61.2 kg (135 lb)   Height: 149.9 cm (59\")      Body mass index is 27.27 kg/m².         REVIEW OF SYSTEMS.  Full review of systems available on the intake form which is scanned in the media tab.  The relevant positive are noted below  Daytime excessive sleepiness with [place] Sleepiness Scale :Total score: 2   Snoring  Dizziness anxiety at times      Physical exam:  Vitals:    10/30/23 1100   BP: 127/60   BP Location: Right arm   Patient Position: Sitting   Pulse: 50   Resp: 12   SpO2: 98%   Weight: 61.2 kg (135 lb)   Height: 149.9 cm (59\")    Body mass index is 27.27 kg/m².    HEENT: Head is atraumatic, normocephalic  Eyes: pupils are round equal and reacting to light and accommodation, conjunctiva normal  Throat: tongue normal  RESPIRATORY SYSTEM: Regular respirations  CARDIOVASULAR SYSTEM: Regular rate  EXTREMITES: No cyanosis, clubbing  NEUROLOGICAL SYSTEM: Oriented x 3, no gross motor defects, gait normal      Impression:  1. Obstructive sleep apnea    2. Hypersomnia    3. Non-restorative sleep    4. Overweight with body mass index (BMI) 25.0-29.9    5. Insomnia, unspecified type        Plan:    Good sleep hygiene measures should be maintained.  Weight loss would be beneficial in this patient who is overweight BMI 27.3.    I discussed the " pathophysiology of obstructive sleep apnea with the patient.  We discussed the adverse outcomes associated with untreated sleep-disordered breathing.  We discussed treatment modalities of obstructive sleep apnea including CPAP device as well as oral mandibular advancement device. Weight loss will be strongly beneficial in order to reduce the severity of sleep-disordered breathing. Caution during activities that require prolonged concentration is strongly advised.      Changing of PAP supplies regularly is important for effective use. Patient needs to change cushion on the mask or plugs on nasal pillows along with disposable filters once every month and change mask frame, tubing, headgear and Velcro straps every 6 months at the minimum.    Thank you for allowing me to participate in your patient's care.    Yuri Cabrales MD  Sleep Medicine  10/30/23  11:52 EDT

## 2023-12-06 ENCOUNTER — TELEPHONE (OUTPATIENT)
Dept: FAMILY MEDICINE CLINIC | Facility: CLINIC | Age: 63
End: 2023-12-06

## 2023-12-06 NOTE — TELEPHONE ENCOUNTER
I called patient and left a voicemail to reschedule appointment.  Asked patient to call back office at her earliest convenience.  I will happily try to squeeze her in next week or when next a possible.

## 2024-01-05 DIAGNOSIS — I10 PRIMARY HYPERTENSION: ICD-10-CM

## 2024-01-08 DIAGNOSIS — I10 PRIMARY HYPERTENSION: ICD-10-CM

## 2024-01-08 RX ORDER — AMLODIPINE BESYLATE 5 MG/1
5 TABLET ORAL DAILY
Qty: 90 TABLET | Refills: 0 | Status: SHIPPED | OUTPATIENT
Start: 2024-01-08

## 2024-01-08 RX ORDER — AMLODIPINE BESYLATE 5 MG/1
5 TABLET ORAL DAILY
Qty: 30 TABLET | Refills: 0 | Status: SHIPPED | OUTPATIENT
Start: 2024-01-08 | End: 2024-01-08

## 2024-05-06 ENCOUNTER — OFFICE VISIT (OUTPATIENT)
Dept: FAMILY MEDICINE CLINIC | Facility: CLINIC | Age: 64
End: 2024-05-06
Payer: OTHER GOVERNMENT

## 2024-05-06 VITALS
WEIGHT: 140.4 LBS | HEART RATE: 59 BPM | RESPIRATION RATE: 18 BRPM | DIASTOLIC BLOOD PRESSURE: 68 MMHG | TEMPERATURE: 98 F | BODY MASS INDEX: 28.3 KG/M2 | OXYGEN SATURATION: 97 % | HEIGHT: 59 IN | SYSTOLIC BLOOD PRESSURE: 112 MMHG

## 2024-05-06 DIAGNOSIS — M79.602 LEFT ARM PAIN: ICD-10-CM

## 2024-05-06 DIAGNOSIS — Z13.29 SCREENING FOR THYROID DISORDER: ICD-10-CM

## 2024-05-06 DIAGNOSIS — E78.2 MIXED HYPERLIPIDEMIA: Primary | ICD-10-CM

## 2024-05-06 DIAGNOSIS — I10 PRIMARY HYPERTENSION: ICD-10-CM

## 2024-05-06 DIAGNOSIS — E04.1 THYROID NODULE: ICD-10-CM

## 2024-05-06 DIAGNOSIS — Z78.0 POST-MENOPAUSAL: ICD-10-CM

## 2024-05-06 DIAGNOSIS — Z13.220 SCREENING FOR LIPID DISORDERS: ICD-10-CM

## 2024-05-06 PROCEDURE — 99215 OFFICE O/P EST HI 40 MIN: CPT | Performed by: REGISTERED NURSE

## 2024-05-07 ENCOUNTER — CLINICAL SUPPORT (OUTPATIENT)
Dept: FAMILY MEDICINE CLINIC | Facility: CLINIC | Age: 64
End: 2024-05-07
Payer: OTHER GOVERNMENT

## 2024-05-07 DIAGNOSIS — E04.1 THYROID NODULE: ICD-10-CM

## 2024-05-07 DIAGNOSIS — I10 PRIMARY HYPERTENSION: ICD-10-CM

## 2024-05-07 DIAGNOSIS — Z13.220 SCREENING FOR LIPID DISORDERS: ICD-10-CM

## 2024-05-07 DIAGNOSIS — E78.2 MIXED HYPERLIPIDEMIA: ICD-10-CM

## 2024-05-07 DIAGNOSIS — Z13.29 SCREENING FOR THYROID DISORDER: ICD-10-CM

## 2024-05-07 PROCEDURE — 83036 HEMOGLOBIN GLYCOSYLATED A1C: CPT | Performed by: REGISTERED NURSE

## 2024-05-07 PROCEDURE — 84443 ASSAY THYROID STIM HORMONE: CPT | Performed by: REGISTERED NURSE

## 2024-05-07 PROCEDURE — 80061 LIPID PANEL: CPT | Performed by: REGISTERED NURSE

## 2024-05-07 PROCEDURE — 36415 COLL VENOUS BLD VENIPUNCTURE: CPT | Performed by: REGISTERED NURSE

## 2024-05-07 PROCEDURE — 85025 COMPLETE CBC W/AUTO DIFF WBC: CPT | Performed by: REGISTERED NURSE

## 2024-05-07 PROCEDURE — 80053 COMPREHEN METABOLIC PANEL: CPT | Performed by: REGISTERED NURSE

## 2024-05-08 LAB
ALBUMIN SERPL-MCNC: 4.2 G/DL (ref 3.5–5.2)
ALBUMIN/GLOB SERPL: 1.4 G/DL
ALP SERPL-CCNC: 62 U/L (ref 39–117)
ALT SERPL W P-5'-P-CCNC: 16 U/L (ref 1–33)
ANION GAP SERPL CALCULATED.3IONS-SCNC: 10 MMOL/L (ref 5–15)
AST SERPL-CCNC: 22 U/L (ref 1–32)
BASOPHILS # BLD AUTO: 0.06 10*3/MM3 (ref 0–0.2)
BASOPHILS NFR BLD AUTO: 1.2 % (ref 0–1.5)
BILIRUB SERPL-MCNC: 0.2 MG/DL (ref 0–1.2)
BUN SERPL-MCNC: 14 MG/DL (ref 8–23)
BUN/CREAT SERPL: 21.9 (ref 7–25)
CALCIUM SPEC-SCNC: 8.7 MG/DL (ref 8.6–10.5)
CHLORIDE SERPL-SCNC: 108 MMOL/L (ref 98–107)
CHOLEST SERPL-MCNC: 195 MG/DL (ref 0–200)
CO2 SERPL-SCNC: 23 MMOL/L (ref 22–29)
CREAT SERPL-MCNC: 0.64 MG/DL (ref 0.57–1)
DEPRECATED RDW RBC AUTO: 40.9 FL (ref 37–54)
EGFRCR SERPLBLD CKD-EPI 2021: 98.8 ML/MIN/1.73
EOSINOPHIL # BLD AUTO: 0.21 10*3/MM3 (ref 0–0.4)
EOSINOPHIL NFR BLD AUTO: 4.1 % (ref 0.3–6.2)
ERYTHROCYTE [DISTWIDTH] IN BLOOD BY AUTOMATED COUNT: 12.6 % (ref 12.3–15.4)
GLOBULIN UR ELPH-MCNC: 2.9 GM/DL
GLUCOSE SERPL-MCNC: 77 MG/DL (ref 65–99)
HBA1C MFR BLD: 5.7 % (ref 4.8–5.6)
HCT VFR BLD AUTO: 43 % (ref 34–46.6)
HDLC SERPL-MCNC: 50 MG/DL (ref 40–60)
HGB BLD-MCNC: 14.1 G/DL (ref 12–15.9)
IMM GRANULOCYTES # BLD AUTO: 0 10*3/MM3 (ref 0–0.05)
IMM GRANULOCYTES NFR BLD AUTO: 0 % (ref 0–0.5)
LDLC SERPL CALC-MCNC: 125 MG/DL (ref 0–100)
LDLC/HDLC SERPL: 2.46 {RATIO}
LYMPHOCYTES # BLD AUTO: 2.41 10*3/MM3 (ref 0.7–3.1)
LYMPHOCYTES NFR BLD AUTO: 47.5 % (ref 19.6–45.3)
MCH RBC QN AUTO: 29.3 PG (ref 26.6–33)
MCHC RBC AUTO-ENTMCNC: 32.8 G/DL (ref 31.5–35.7)
MCV RBC AUTO: 89.4 FL (ref 79–97)
MONOCYTES # BLD AUTO: 0.24 10*3/MM3 (ref 0.1–0.9)
MONOCYTES NFR BLD AUTO: 4.7 % (ref 5–12)
NEUTROPHILS NFR BLD AUTO: 2.15 10*3/MM3 (ref 1.7–7)
NEUTROPHILS NFR BLD AUTO: 42.5 % (ref 42.7–76)
NRBC BLD AUTO-RTO: 0 /100 WBC (ref 0–0.2)
PLATELET # BLD AUTO: 254 10*3/MM3 (ref 140–450)
PMV BLD AUTO: 11.7 FL (ref 6–12)
POTASSIUM SERPL-SCNC: 4.2 MMOL/L (ref 3.5–5.2)
PROT SERPL-MCNC: 7.1 G/DL (ref 6–8.5)
RBC # BLD AUTO: 4.81 10*6/MM3 (ref 3.77–5.28)
SODIUM SERPL-SCNC: 141 MMOL/L (ref 136–145)
TRIGL SERPL-MCNC: 111 MG/DL (ref 0–150)
TSH SERPL DL<=0.05 MIU/L-ACNC: 3.24 UIU/ML (ref 0.27–4.2)
VLDLC SERPL-MCNC: 20 MG/DL (ref 5–40)
WBC NRBC COR # BLD AUTO: 5.07 10*3/MM3 (ref 3.4–10.8)

## 2024-10-02 DIAGNOSIS — I10 PRIMARY HYPERTENSION: ICD-10-CM

## 2024-10-02 RX ORDER — AMLODIPINE BESYLATE 5 MG/1
5 TABLET ORAL DAILY
Qty: 90 TABLET | Refills: 0 | Status: SHIPPED | OUTPATIENT
Start: 2024-10-02

## 2024-11-08 ENCOUNTER — OFFICE VISIT (OUTPATIENT)
Dept: FAMILY MEDICINE CLINIC | Facility: CLINIC | Age: 64
End: 2024-11-08
Payer: OTHER GOVERNMENT

## 2024-11-08 VITALS
DIASTOLIC BLOOD PRESSURE: 81 MMHG | TEMPERATURE: 97.9 F | HEIGHT: 59 IN | BODY MASS INDEX: 26.49 KG/M2 | RESPIRATION RATE: 18 BRPM | OXYGEN SATURATION: 99 % | SYSTOLIC BLOOD PRESSURE: 138 MMHG | WEIGHT: 131.4 LBS | HEART RATE: 60 BPM

## 2024-11-08 DIAGNOSIS — Z12.31 ENCOUNTER FOR SCREENING MAMMOGRAM FOR MALIGNANT NEOPLASM OF BREAST: ICD-10-CM

## 2024-11-08 DIAGNOSIS — E78.2 MIXED HYPERLIPIDEMIA: ICD-10-CM

## 2024-11-08 DIAGNOSIS — Z13.29 SCREENING FOR ENDOCRINE, METABOLIC AND IMMUNITY DISORDER: ICD-10-CM

## 2024-11-08 DIAGNOSIS — R73.03 PREDIABETES: ICD-10-CM

## 2024-11-08 DIAGNOSIS — Z13.228 SCREENING FOR ENDOCRINE, METABOLIC AND IMMUNITY DISORDER: ICD-10-CM

## 2024-11-08 DIAGNOSIS — Z13.29 SCREENING FOR THYROID DISORDER: ICD-10-CM

## 2024-11-08 DIAGNOSIS — Z13.1 SCREENING FOR DIABETES MELLITUS: ICD-10-CM

## 2024-11-08 DIAGNOSIS — E04.1 THYROID NODULE: ICD-10-CM

## 2024-11-08 DIAGNOSIS — Z13.0 SCREENING FOR ENDOCRINE, METABOLIC AND IMMUNITY DISORDER: ICD-10-CM

## 2024-11-08 DIAGNOSIS — I10 PRIMARY HYPERTENSION: Primary | ICD-10-CM

## 2024-11-08 NOTE — PROGRESS NOTES
Chief Complaint  Follow-up, Hypertension, and Hyperlipidemia    Subjective    History of Present Illness {CC  Problem List  Visit  Diagnosis   Encounters  Notes  Medications  Labs  Result Review Imaging  Media :23}     Tiny Avina presents to [facility] for Follow-up, Hypertension, and Hyperlipidemia.      History of Present Illness  Patient is a 64 y.o. female who presents to the clinic today for 6-month follow-up for hypertension, hyperlipidemia, and prediabetes.  Patient denies any chest pain, shortness of breath, or any fevers.  Patient denies any known exposure to COVID, flu, or any other contagious illnesses.       History of Present Illness  In regards to hypertension, patient's blood pressure today is well-controlled at 138/81.  She reports that her systolic blood pressure readings at home typically range in the 120s but can occasionally spike to 150, particularly during periods of stress. She is currently managing her blood pressure with amlodipine and hydralazine, which she takes in the evening.  Patient denies any issues or concerns with her hypertension or hypertension medication at this time.    In regards to hyperlipidemia, patient has been working diligently to manage this without medication at her request.  She feels that she has made significant progress. She recalls her previous cholesterol was significantly elevated. She is hopeful that her upcoming lab results will show normal cholesterol levels.  She shares that she has significantly changed her diet and has reduced any nonessential snacking.    Additionally, she requests to have her A1c, T4, TSH, and iron levels checked.       Review of Systems   Constitutional: Negative.  Negative for activity change, chills, fatigue and fever.   HENT: Negative.  Negative for congestion, dental problem, ear pain, hearing loss, rhinorrhea, sinus pain, sore throat, tinnitus and trouble swallowing.    Eyes: Negative.   "Negative for pain and visual disturbance.   Respiratory: Negative.  Negative for cough, chest tightness, shortness of breath and wheezing.    Cardiovascular: Negative.  Negative for chest pain, palpitations and leg swelling.   Gastrointestinal: Negative.  Negative for abdominal pain, diarrhea, nausea and vomiting.   Endocrine: Negative.  Negative for polydipsia, polyphagia and polyuria.   Genitourinary: Negative.  Negative for difficulty urinating, dysuria, frequency and urgency.   Musculoskeletal: Negative.  Negative for arthralgias, back pain and myalgias.   Skin: Negative.  Negative for color change, pallor, rash and wound.   Allergic/Immunologic: Negative.  Negative for environmental allergies.   Neurological: Negative.  Negative for dizziness, speech difficulty, weakness, light-headedness, numbness and headaches.   Hematological: Negative.    Psychiatric/Behavioral: Negative.  Negative for confusion, decreased concentration, self-injury and suicidal ideas. The patient is not nervous/anxious.    All other systems reviewed and are negative.       Objective     Vital Signs:   /81 (BP Location: Left arm, Patient Position: Sitting, Cuff Size: Adult)   Pulse 60   Temp 97.9 °F (36.6 °C) (Oral)   Resp 18   Ht 149.9 cm (59\")   Wt 59.6 kg (131 lb 6.4 oz)   SpO2 99%   BMI 26.54 kg/m²   Current Outpatient Medications on File Prior to Visit   Medication Sig Dispense Refill    amLODIPine (NORVASC) 5 MG tablet TAKE 1 TABLET BY MOUTH DAILY 90 tablet 0    ascorbic acid (VITAMIN C) 1000 MG tablet Take 1 tablet by mouth Daily.      B Complex Vitamins (VITAMIN B COMPLEX) capsule capsule Take 1 capsule by mouth Daily.      busPIRone (BUSPAR) 5 MG tablet Take 1 tablet by mouth 3 (Three) Times a Day As Needed (anxiety). 30 tablet 2    Calcium-Magnesium-Zinc 167-83-8 MG tablet Take 1 tablet by mouth Daily.      cholecalciferol (VITAMIN D3) 25 MCG (1000 UT) tablet Take 1 tablet by mouth Daily.      Coenzyme Q10 (Co Q10) " 100 MG capsule       diphenhydrAMINE-acetaminophen (TYLENOL PM)  MG tablet per tablet Take 1 tablet by mouth At Night As Needed for Sleep.      Garlic 1000 MG capsule Take 1 capsule by mouth Daily.      hydrALAZINE (APRESOLINE) 10 MG tablet TAKE 1 TABLET BY MOUTH THREE TIMES DAILY 270 tablet 1    magnesium oxide (MAG-OX) 400 MG tablet Take 1 tablet by mouth Daily.      Omega 3-6-9 Fatty Acids (TRIPLE OMEGA-3-6-9 PO) Take 1 capsule by mouth Daily.      vitamin A 65390 UNIT capsule Take 1 capsule by mouth Daily.       No current facility-administered medications on file prior to visit.        Past Medical History:   Diagnosis Date    Arrhythmia     Arthritis     osteoarthritis    Chest pain     Diverticulosis     Enlarged thyroid     H/O varicose veins     phlebitis    Heart murmur     Heart valve disease     Hyperlipidemia     Hypertension     Insomnia     Irregular heart beat     Mitral valve prolapse     Palpitations     Scoliosis     Vitamin D deficiency       Past Surgical History:   Procedure Laterality Date    COLONOSCOPY      CYST REMOVAL Left     breast  //  benign      Family History   Problem Relation Age of Onset    Thyroid disease Mother             Hypertension Mother          at 93yo    Heart failure Mother         Heart failed    Cancer Father         lung ()    Thyroid disease Sister     Liver cancer Sister     Cancer Sister         Liver ()    Thyroid disease Sister         taking meds    Cancer Sister         Breast(Living)    Diabetes Maternal Aunt             Diabetes Maternal Uncle     Sudden death Maternal Uncle     Hypertension Maternal Uncle         (stroke)    Thyroid disease Other         goiter, removed    Sleep apnea Neg Hx       Social History     Socioeconomic History    Marital status:    Tobacco Use    Smoking status: Never    Smokeless tobacco: Never    Tobacco comments:     never a smoker   Vaping Use    Vaping  status: Never Used   Substance and Sexual Activity    Alcohol use: No     Comment: caffeine use    Drug use: No    Sexual activity: Not Currently     Birth control/protection: Abstinence         No visits with results within 3 Month(s) from this visit.   Latest known visit with results is:   Clinical Support on 05/07/2024   Component Date Value Ref Range Status    Glucose 05/07/2024 77  65 - 99 mg/dL Final    BUN 05/07/2024 14  8 - 23 mg/dL Final    Creatinine 05/07/2024 0.64  0.57 - 1.00 mg/dL Final    Sodium 05/07/2024 141  136 - 145 mmol/L Final    Potassium 05/07/2024 4.2  3.5 - 5.2 mmol/L Final    Chloride 05/07/2024 108 (H)  98 - 107 mmol/L Final    CO2 05/07/2024 23.0  22.0 - 29.0 mmol/L Final    Calcium 05/07/2024 8.7  8.6 - 10.5 mg/dL Final    Total Protein 05/07/2024 7.1  6.0 - 8.5 g/dL Final    Albumin 05/07/2024 4.2  3.5 - 5.2 g/dL Final    ALT (SGPT) 05/07/2024 16  1 - 33 U/L Final    AST (SGOT) 05/07/2024 22  1 - 32 U/L Final    Alkaline Phosphatase 05/07/2024 62  39 - 117 U/L Final    Total Bilirubin 05/07/2024 0.2  0.0 - 1.2 mg/dL Final    Globulin 05/07/2024 2.9  gm/dL Final    A/G Ratio 05/07/2024 1.4  g/dL Final    BUN/Creatinine Ratio 05/07/2024 21.9  7.0 - 25.0 Final    Anion Gap 05/07/2024 10.0  5.0 - 15.0 mmol/L Final    eGFR 05/07/2024 98.8  >60.0 mL/min/1.73 Final    Total Cholesterol 05/07/2024 195  0 - 200 mg/dL Final    Triglycerides 05/07/2024 111  0 - 150 mg/dL Final    HDL Cholesterol 05/07/2024 50  40 - 60 mg/dL Final    LDL Cholesterol  05/07/2024 125 (H)  0 - 100 mg/dL Final    VLDL Cholesterol 05/07/2024 20  5 - 40 mg/dL Final    LDL/HDL Ratio 05/07/2024 2.46   Final    Hemoglobin A1C 05/07/2024 5.70 (H)  4.80 - 5.60 % Final    TSH 05/07/2024 3.240  0.270 - 4.200 uIU/mL Final    WBC 05/07/2024 5.07  3.40 - 10.80 10*3/mm3 Final    RBC 05/07/2024 4.81  3.77 - 5.28 10*6/mm3 Final    Hemoglobin 05/07/2024 14.1  12.0 - 15.9 g/dL Final    Hematocrit 05/07/2024 43.0  34.0 - 46.6 % Final     MCV 05/07/2024 89.4  79.0 - 97.0 fL Final    MCH 05/07/2024 29.3  26.6 - 33.0 pg Final    MCHC 05/07/2024 32.8  31.5 - 35.7 g/dL Final    RDW 05/07/2024 12.6  12.3 - 15.4 % Final    RDW-SD 05/07/2024 40.9  37.0 - 54.0 fl Final    MPV 05/07/2024 11.7  6.0 - 12.0 fL Final    Platelets 05/07/2024 254  140 - 450 10*3/mm3 Final    Neutrophil % 05/07/2024 42.5 (L)  42.7 - 76.0 % Final    Lymphocyte % 05/07/2024 47.5 (H)  19.6 - 45.3 % Final    Monocyte % 05/07/2024 4.7 (L)  5.0 - 12.0 % Final    Eosinophil % 05/07/2024 4.1  0.3 - 6.2 % Final    Basophil % 05/07/2024 1.2  0.0 - 1.5 % Final    Immature Grans % 05/07/2024 0.0  0.0 - 0.5 % Final    Neutrophils, Absolute 05/07/2024 2.15  1.70 - 7.00 10*3/mm3 Final    Lymphocytes, Absolute 05/07/2024 2.41  0.70 - 3.10 10*3/mm3 Final    Monocytes, Absolute 05/07/2024 0.24  0.10 - 0.90 10*3/mm3 Final    Eosinophils, Absolute 05/07/2024 0.21  0.00 - 0.40 10*3/mm3 Final    Basophils, Absolute 05/07/2024 0.06  0.00 - 0.20 10*3/mm3 Final    Immature Grans, Absolute 05/07/2024 0.00  0.00 - 0.05 10*3/mm3 Final    nRBC 05/07/2024 0.0  0.0 - 0.2 /100 WBC Final         Physical Exam  Vitals and nursing note reviewed.   Constitutional:       Appearance: Normal appearance. She is normal weight.   HENT:      Head: Normocephalic and atraumatic.   Cardiovascular:      Rate and Rhythm: Normal rate and regular rhythm.      Pulses: Normal pulses.      Heart sounds: Normal heart sounds. No murmur heard.     No friction rub. No gallop.   Pulmonary:      Effort: Pulmonary effort is normal. No respiratory distress.      Breath sounds: Normal breath sounds. No stridor. No wheezing, rhonchi or rales.   Chest:      Chest wall: No tenderness.   Abdominal:      General: Abdomen is flat. Bowel sounds are normal. There is no distension.      Palpations: Abdomen is soft. There is no mass.      Tenderness: There is no abdominal tenderness. There is no right CVA tenderness, left CVA tenderness, guarding or  rebound.      Hernia: No hernia is present.   Skin:     General: Skin is warm and dry.      Capillary Refill: Capillary refill takes less than 2 seconds.      Coloration: Skin is not jaundiced or pale.   Neurological:      General: No focal deficit present.      Mental Status: She is alert and oriented to person, place, and time. Mental status is at baseline.      Motor: No weakness.      Coordination: Coordination normal.      Gait: Gait normal.   Psychiatric:         Mood and Affect: Mood normal.         Behavior: Behavior normal.         Thought Content: Thought content normal.         Judgment: Judgment normal.          Physical Exam  Vital Signs  Vitals show a heart rate of 97. Blood pressure is 138/81.       Result Review  Data Reviewed:{ Labs  Result Review  Imaging  Med Tab  Media :23}   I have reviewed this patient's chart.  I have reviewed previous labs, previous imaging, previous medications, and previous encounters with notes that were available in this patient's chart.    Results                Assessment and Plan {CC Problem List  Visit Diagnosis  ROS  Review (Popup)  [facility]  Quality  BestPractice  Medications  SmartSets  SnapShot Encounters  Media :23}   Diagnoses and all orders for this visit:    1. Primary hypertension (Primary)  -     CBC & Differential; Future  -     Comprehensive Metabolic Panel; Future  -     Hemoglobin A1c; Future  -     Iron Profile; Future    2. Screening for endocrine, metabolic and immunity disorder  -     CBC & Differential; Future  -     Comprehensive Metabolic Panel; Future  -     T4, Free; Future  -     TSH; Future  -     T3; Future    3. Mixed hyperlipidemia  -     Lipid Panel; Future    4. Prediabetes  -     Hemoglobin A1c; Future  -     Iron Profile; Future    5. Encounter for screening mammogram for malignant neoplasm of breast  -     Mammo Screening Digital Tomosynthesis Bilateral With CAD; Future    6. Screening for thyroid disorder  -      T4, Free; Future  -     TSH; Future  -     T3; Future    7. Thyroid nodule  -     T4, Free; Future  -     TSH; Future  -     T3; Future    8. Screening for diabetes mellitus  -     Hemoglobin A1c; Future        Assessment & Plan  1. Hypertension.  Her blood pressure is well-controlled at 138/81, even after physical activity. She reports that her systolic blood pressure fluctuates between the lower 120s and 150s, depending on stress levels. She is currently taking amlodipine. Continued monitoring of blood pressure at home is recommended.    2. Hyperlipidemia.  She has been working diligently to manage her cholesterol levels through lifestyle modifications and has successfully brought her total cholesterol within normal limits. She is advised to continue her current dietary regimen to maintain these levels.    3. Prediabetes.  An A1c test will be ordered to monitor her blood sugar levels. She is advised to continue her current management plan and lifestyle modifications.    4. Thyroid Disorder.  A comprehensive thyroid panel including T3, T4, and TSH will be ordered to monitor her thyroid function.    5. Iron Deficiency screen.  An iron test will be ordered to check her iron levels.    6. Health Maintenance.  A comprehensive set of labs will be ordered, including CBC, CMP, kidney and liver function tests, A1c, cholesterol, and thyroid panel. She will schedule the lab tests for Monday morning to ensure fasting.    -Patient is not fasting today and is requesting to do labs on Monday after she has been able to fast for 8 or more hours.   -ER red flags discussed with patient including risk versus benefit and education provided.  -Follow-up with me in 3 months or sooner if needed.    I spent 30 minutes caring for Tiny on this date of service. This time includes time spent by me in the following activities:preparing for the visit, reviewing tests, obtaining and/or reviewing a separately obtained history, performing a  medically appropriate examination and/or evaluation , counseling and educating the patient/family/caregiver, ordering medications, tests, or procedures, referring and communicating with other health care professionals , documenting information in the medical record, independently interpreting results and communicating that information with the patient/family/caregiver, and care coordination.    Follow Up {Instructions Charge Capture  Follow-up Communications :23}     Patient was given instructions and counseling regarding her condition or for health maintenance advice. Please see specific information pulled into the AVS (placed there by myself) if appropriate.    Return in about 3 months (around 2/8/2025) for routine follow up.    BMI is >= 25 and <30. (Overweight) The following options were offered after discussion;: exercise counseling/recommendations and nutrition counseling/recommendations         ANTHONY Baird, [facility]      Patient or patient representative verbalized consent for the use of Ambient Listening during the visit with  ANTHONY Baird for chart documentation. 11/8/2024  13:54 EST

## 2024-11-11 ENCOUNTER — CLINICAL SUPPORT (OUTPATIENT)
Dept: FAMILY MEDICINE CLINIC | Facility: CLINIC | Age: 64
End: 2024-11-11
Payer: OTHER GOVERNMENT

## 2024-11-11 DIAGNOSIS — Z13.1 SCREENING FOR DIABETES MELLITUS: ICD-10-CM

## 2024-11-11 DIAGNOSIS — Z13.228 SCREENING FOR ENDOCRINE, METABOLIC AND IMMUNITY DISORDER: ICD-10-CM

## 2024-11-11 DIAGNOSIS — Z13.29 SCREENING FOR ENDOCRINE, METABOLIC AND IMMUNITY DISORDER: ICD-10-CM

## 2024-11-11 DIAGNOSIS — E04.1 THYROID NODULE: ICD-10-CM

## 2024-11-11 DIAGNOSIS — I10 PRIMARY HYPERTENSION: ICD-10-CM

## 2024-11-11 DIAGNOSIS — R73.03 PREDIABETES: ICD-10-CM

## 2024-11-11 DIAGNOSIS — E78.2 MIXED HYPERLIPIDEMIA: ICD-10-CM

## 2024-11-11 DIAGNOSIS — Z13.0 SCREENING FOR ENDOCRINE, METABOLIC AND IMMUNITY DISORDER: ICD-10-CM

## 2024-11-11 DIAGNOSIS — Z13.29 SCREENING FOR THYROID DISORDER: ICD-10-CM

## 2024-11-11 LAB
BASOPHILS # BLD AUTO: 0.05 10*3/MM3 (ref 0–0.2)
BASOPHILS NFR BLD AUTO: 1 % (ref 0–1.5)
DEPRECATED RDW RBC AUTO: 39 FL (ref 37–54)
EOSINOPHIL # BLD AUTO: 0.13 10*3/MM3 (ref 0–0.4)
EOSINOPHIL NFR BLD AUTO: 2.5 % (ref 0.3–6.2)
ERYTHROCYTE [DISTWIDTH] IN BLOOD BY AUTOMATED COUNT: 11.7 % (ref 12.3–15.4)
HCT VFR BLD AUTO: 45.4 % (ref 34–46.6)
HGB BLD-MCNC: 14.9 G/DL (ref 12–15.9)
IMM GRANULOCYTES # BLD AUTO: 0 10*3/MM3 (ref 0–0.05)
IMM GRANULOCYTES NFR BLD AUTO: 0 % (ref 0–0.5)
LYMPHOCYTES # BLD AUTO: 2.44 10*3/MM3 (ref 0.7–3.1)
LYMPHOCYTES NFR BLD AUTO: 46.9 % (ref 19.6–45.3)
MCH RBC QN AUTO: 30.1 PG (ref 26.6–33)
MCHC RBC AUTO-ENTMCNC: 32.8 G/DL (ref 31.5–35.7)
MCV RBC AUTO: 91.7 FL (ref 79–97)
MONOCYTES # BLD AUTO: 0.25 10*3/MM3 (ref 0.1–0.9)
MONOCYTES NFR BLD AUTO: 4.8 % (ref 5–12)
NEUTROPHILS NFR BLD AUTO: 2.33 10*3/MM3 (ref 1.7–7)
NEUTROPHILS NFR BLD AUTO: 44.8 % (ref 42.7–76)
NRBC BLD AUTO-RTO: 0 /100 WBC (ref 0–0.2)
PLATELET # BLD AUTO: 276 10*3/MM3 (ref 140–450)
PMV BLD AUTO: 11 FL (ref 6–12)
RBC # BLD AUTO: 4.95 10*6/MM3 (ref 3.77–5.28)
WBC NRBC COR # BLD AUTO: 5.2 10*3/MM3 (ref 3.4–10.8)

## 2024-11-11 PROCEDURE — 84439 ASSAY OF FREE THYROXINE: CPT | Performed by: REGISTERED NURSE

## 2024-11-11 PROCEDURE — 83036 HEMOGLOBIN GLYCOSYLATED A1C: CPT | Performed by: REGISTERED NURSE

## 2024-11-11 PROCEDURE — 84466 ASSAY OF TRANSFERRIN: CPT | Performed by: REGISTERED NURSE

## 2024-11-11 PROCEDURE — 36415 COLL VENOUS BLD VENIPUNCTURE: CPT | Performed by: REGISTERED NURSE

## 2024-11-11 PROCEDURE — 80053 COMPREHEN METABOLIC PANEL: CPT | Performed by: REGISTERED NURSE

## 2024-11-11 PROCEDURE — 85025 COMPLETE CBC W/AUTO DIFF WBC: CPT | Performed by: REGISTERED NURSE

## 2024-11-11 PROCEDURE — 84443 ASSAY THYROID STIM HORMONE: CPT | Performed by: REGISTERED NURSE

## 2024-11-11 PROCEDURE — 83540 ASSAY OF IRON: CPT | Performed by: REGISTERED NURSE

## 2024-11-11 PROCEDURE — 84480 ASSAY TRIIODOTHYRONINE (T3): CPT | Performed by: REGISTERED NURSE

## 2024-11-11 PROCEDURE — 80061 LIPID PANEL: CPT | Performed by: REGISTERED NURSE

## 2024-11-11 NOTE — PROGRESS NOTES
Venipuncture Blood Specimen Collection  Venipuncture performed in RT ARM by Alin Valencia with good hemostasis. Patient tolerated the procedure well without complications.   11/11/24   Alin Valencia

## 2024-11-12 LAB
ALBUMIN SERPL-MCNC: 4.1 G/DL (ref 3.5–5.2)
ALBUMIN/GLOB SERPL: 1.4 G/DL
ALP SERPL-CCNC: 65 U/L (ref 39–117)
ALT SERPL W P-5'-P-CCNC: 16 U/L (ref 1–33)
ANION GAP SERPL CALCULATED.3IONS-SCNC: 10.1 MMOL/L (ref 5–15)
AST SERPL-CCNC: 18 U/L (ref 1–32)
BILIRUB SERPL-MCNC: 0.3 MG/DL (ref 0–1.2)
BUN SERPL-MCNC: 14 MG/DL (ref 8–23)
BUN/CREAT SERPL: 17.5 (ref 7–25)
CALCIUM SPEC-SCNC: 9.1 MG/DL (ref 8.6–10.5)
CHLORIDE SERPL-SCNC: 107 MMOL/L (ref 98–107)
CHOLEST SERPL-MCNC: 216 MG/DL (ref 0–200)
CO2 SERPL-SCNC: 23.9 MMOL/L (ref 22–29)
CREAT SERPL-MCNC: 0.8 MG/DL (ref 0.57–1)
EGFRCR SERPLBLD CKD-EPI 2021: 82.4 ML/MIN/1.73
GLOBULIN UR ELPH-MCNC: 3 GM/DL
GLUCOSE SERPL-MCNC: 82 MG/DL (ref 65–99)
HBA1C MFR BLD: 5.7 % (ref 4.8–5.6)
HDLC SERPL-MCNC: 54 MG/DL (ref 40–60)
IRON 24H UR-MRATE: 125 MCG/DL (ref 37–145)
IRON SATN MFR SERPL: 34 % (ref 20–50)
LDLC SERPL CALC-MCNC: 139 MG/DL (ref 0–100)
LDLC/HDLC SERPL: 2.53 {RATIO}
POTASSIUM SERPL-SCNC: 3.8 MMOL/L (ref 3.5–5.2)
PROT SERPL-MCNC: 7.1 G/DL (ref 6–8.5)
SODIUM SERPL-SCNC: 141 MMOL/L (ref 136–145)
T3 SERPL-MCNC: 88.3 NG/DL (ref 80–200)
T4 FREE SERPL-MCNC: 1.23 NG/DL (ref 0.92–1.68)
TIBC SERPL-MCNC: 371 MCG/DL (ref 298–536)
TRANSFERRIN SERPL-MCNC: 249 MG/DL (ref 200–360)
TRIGL SERPL-MCNC: 127 MG/DL (ref 0–150)
TSH SERPL DL<=0.05 MIU/L-ACNC: 2.82 UIU/ML (ref 0.27–4.2)
VLDLC SERPL-MCNC: 23 MG/DL (ref 5–40)

## 2024-12-13 DIAGNOSIS — M81.0 AGE-RELATED OSTEOPOROSIS WITHOUT CURRENT PATHOLOGICAL FRACTURE: Primary | ICD-10-CM

## 2024-12-13 DIAGNOSIS — Z78.0 POST-MENOPAUSAL: ICD-10-CM

## 2024-12-13 RX ORDER — ALENDRONATE SODIUM 70 MG/1
70 TABLET ORAL
Qty: 12 TABLET | Refills: 1 | Status: SHIPPED | OUTPATIENT
Start: 2024-12-13

## 2025-01-07 DIAGNOSIS — I10 PRIMARY HYPERTENSION: ICD-10-CM

## 2025-01-07 RX ORDER — HYDRALAZINE HYDROCHLORIDE 10 MG/1
TABLET, FILM COATED ORAL
Qty: 270 TABLET | Refills: 1 | Status: SHIPPED | OUTPATIENT
Start: 2025-01-07

## 2025-01-20 ENCOUNTER — TELEPHONE (OUTPATIENT)
Dept: FAMILY MEDICINE CLINIC | Facility: CLINIC | Age: 65
End: 2025-01-20
Payer: OTHER GOVERNMENT

## 2025-01-21 NOTE — TELEPHONE ENCOUNTER
Caller: Tiny Avina    Relationship to patient: Self    Best call back number: [phone]    Patient is needing: PATIENT CALLED TO GET THE STATUS ON HER ORDERS SHE REQUEST.    PATIENT STATED SHE NEEDS THE ORDERS SENT TO [facility] -[phone].    PLEASE CALL PATIENT WENT SENT.

## 2025-01-22 NOTE — TELEPHONE ENCOUNTER
[facility] IS INSTRUCTED TO RELAY BELOW MESSAGE     IF PT CALLS BACK LET HER KNOW MESSAGE WAS SENT TO [facility]ICARDO ON HIS REPLY THANK YOU

## 2025-01-22 NOTE — TELEPHONE ENCOUNTER
[facility] IS INSTRUCTED TO RELAY BELOW MESSAGE FROM [facility]      IF PT CALLS BACK SHE WILL NEED TO CALL HER NEPHROLOGIST TO GET THAT ORDER SENT THANK YOU

## 2025-01-23 NOTE — TELEPHONE ENCOUNTER
Patient has been advised these need to come from [facility]. She kept asking COULD HE ordered them. I reiterated that nicolas stated they need to be ordered from [facility]

## 2025-01-23 NOTE — TELEPHONE ENCOUNTER
Name: KailynTiny      Relationship: Self      Best Callback Number: [phone]      HUB PROVIDED THE RELAY MESSAGE FROM THE OFFICE      PATIENT: HAS FURTHER QUESTIONS AND WOULD LIKE A CALL BACK AT THE FOLLOWING PHONE [phone]    ADDITIONAL INFORMATION:

## 2025-01-27 DIAGNOSIS — I10 PRIMARY HYPERTENSION: ICD-10-CM

## 2025-01-27 RX ORDER — AMLODIPINE BESYLATE 5 MG/1
5 TABLET ORAL DAILY
Qty: 90 TABLET | Refills: 0 | Status: SHIPPED | OUTPATIENT
Start: 2025-01-27

## 2025-04-15 ENCOUNTER — LAB (OUTPATIENT)
Dept: LAB | Facility: HOSPITAL | Age: 65
End: 2025-04-15
Payer: MEDICARE

## 2025-04-15 ENCOUNTER — TRANSCRIBE ORDERS (OUTPATIENT)
Dept: ADMINISTRATIVE | Facility: HOSPITAL | Age: 65
End: 2025-04-15
Payer: MEDICARE

## 2025-04-15 DIAGNOSIS — I10 ESSENTIAL HYPERTENSION, MALIGNANT: Primary | ICD-10-CM

## 2025-04-15 DIAGNOSIS — N28.1 ACQUIRED CYST OF KIDNEY: ICD-10-CM

## 2025-04-15 DIAGNOSIS — I10 ESSENTIAL HYPERTENSION, MALIGNANT: ICD-10-CM

## 2025-04-15 LAB
ALBUMIN SERPL-MCNC: 4.2 G/DL (ref 3.5–5.2)
ANION GAP SERPL CALCULATED.3IONS-SCNC: 10.2 MMOL/L (ref 5–15)
BUN SERPL-MCNC: 15 MG/DL (ref 8–23)
BUN/CREAT SERPL: 19.2 (ref 7–25)
CALCIUM SPEC-SCNC: 9.6 MG/DL (ref 8.6–10.5)
CHLORIDE SERPL-SCNC: 104 MMOL/L (ref 98–107)
CO2 SERPL-SCNC: 26.8 MMOL/L (ref 22–29)
CREAT SERPL-MCNC: 0.78 MG/DL (ref 0.57–1)
EGFRCR SERPLBLD CKD-EPI 2021: 84.4 ML/MIN/1.73
GLUCOSE SERPL-MCNC: 84 MG/DL (ref 65–99)
PHOSPHATE SERPL-MCNC: 4.1 MG/DL (ref 2.5–4.5)
POTASSIUM SERPL-SCNC: 4.1 MMOL/L (ref 3.5–5.2)
SODIUM SERPL-SCNC: 141 MMOL/L (ref 136–145)

## 2025-04-15 PROCEDURE — 36415 COLL VENOUS BLD VENIPUNCTURE: CPT

## 2025-04-15 PROCEDURE — 80069 RENAL FUNCTION PANEL: CPT

## 2025-04-24 DIAGNOSIS — I10 PRIMARY HYPERTENSION: ICD-10-CM

## 2025-04-24 RX ORDER — AMLODIPINE BESYLATE 5 MG/1
5 TABLET ORAL DAILY
Qty: 90 TABLET | Refills: 0 | Status: SHIPPED | OUTPATIENT
Start: 2025-04-24

## 2025-04-30 ENCOUNTER — OFFICE VISIT (OUTPATIENT)
Dept: FAMILY MEDICINE CLINIC | Facility: CLINIC | Age: 65
End: 2025-04-30
Payer: MEDICARE

## 2025-04-30 VITALS
RESPIRATION RATE: 18 BRPM | HEIGHT: 59 IN | HEART RATE: 50 BPM | BODY MASS INDEX: 28.43 KG/M2 | TEMPERATURE: 97.6 F | OXYGEN SATURATION: 99 % | WEIGHT: 141 LBS | SYSTOLIC BLOOD PRESSURE: 124 MMHG | DIASTOLIC BLOOD PRESSURE: 70 MMHG

## 2025-04-30 DIAGNOSIS — I10 PRIMARY HYPERTENSION: Primary | ICD-10-CM

## 2025-04-30 DIAGNOSIS — Z13.29 SCREENING FOR THYROID DISORDER: ICD-10-CM

## 2025-04-30 DIAGNOSIS — N28.1 RENAL CYST: ICD-10-CM

## 2025-04-30 DIAGNOSIS — Z12.31 ENCOUNTER FOR SCREENING MAMMOGRAM FOR MALIGNANT NEOPLASM OF BREAST: ICD-10-CM

## 2025-04-30 DIAGNOSIS — E78.2 MIXED HYPERLIPIDEMIA: ICD-10-CM

## 2025-04-30 DIAGNOSIS — R73.03 PREDIABETES: ICD-10-CM

## 2025-04-30 NOTE — PROGRESS NOTES
Chief Complaint  Hypertension and Hyperlipidemia    Subjective    History of Present Illness {CC  Problem List  Visit  Diagnosis   Encounters  Notes  Medications  Labs  Result Review Imaging  Media :23}     Tiny Avina presents to [facility] for Hypertension and Hyperlipidemia.      History of Present Illness  Patient is a 65 y.o. female who presents to the clinic today for 3-month follow-up for hypertension and hyperlipidemia.  Patient denies any chest pain, shortness of breath, or any fevers.  Patient denies any known exposure to COVID, flu, or any other contagious illnesses.       History of Present Illness  In regards to hypertension, hypertension is managed with amlodipine, supplemented by hydralazine as needed.  Patient's blood pressure is well-controlled today at 124/70.  She denies any concerns with her hypertension or hypertension medication at this time.    In regards to hyperlipidemia, hyperlipidemia is managed with fish oil and CoQ10, along with dietary fat monitoring.  Patient is trying to avoid statins and manage her hyperlipidemia with close diet control.  She denies any concerns with her hyperlipidemia at this time.    In regards to kidney cyst, patient did establish with nephrologist Dr. Loera who gave her a good report.  His note states that he believes it just to be a simple cyst and does not require any further investigation.  He recommended she return in about 1 year to follow-up.  Patient denies any further concerns with this kidney cyst at this time.    A family history of thyroid issues, including goiter and radiation treatment, has been noted. The patient has a personal history of a thyroid nodule that was previously aspirated and wishes to have her thyroid function assessed.    A mammogram is due, and she prefers to have it done at [place] due to convenience with her full-time work schedule.    FAMILY HISTORY  Her mother had surgery for goiter. Her  "aunt had surgery for goiter. Her sister had radiation treatment for a thyroid condition.       Review of Systems   Constitutional: Negative.  Negative for activity change, chills, fatigue and fever.   HENT: Negative.  Negative for congestion, dental problem, ear pain, hearing loss, rhinorrhea, sinus pain, sore throat, tinnitus and trouble swallowing.    Eyes: Negative.  Negative for pain and visual disturbance.   Respiratory: Negative.  Negative for cough, chest tightness, shortness of breath and wheezing.    Cardiovascular: Negative.  Negative for chest pain, palpitations and leg swelling.   Gastrointestinal: Negative.  Negative for abdominal pain, diarrhea, nausea and vomiting.   Endocrine: Negative.  Negative for polydipsia, polyphagia and polyuria.   Genitourinary: Negative.  Negative for difficulty urinating, dysuria, frequency and urgency.   Musculoskeletal: Negative.  Negative for arthralgias, back pain and myalgias.   Skin: Negative.  Negative for color change, pallor, rash and wound.   Allergic/Immunologic: Negative.  Negative for environmental allergies.   Neurological: Negative.  Negative for dizziness, speech difficulty, weakness, light-headedness, numbness and headaches.   Hematological: Negative.    Psychiatric/Behavioral: Negative.  Negative for confusion, decreased concentration, self-injury and suicidal ideas. The patient is not nervous/anxious.    All other systems reviewed and are negative.       Objective     Vital Signs:   /70 (BP Location: Left arm, Patient Position: Sitting, Cuff Size: Adult)   Pulse 50   Temp 97.6 °F (36.4 °C) (Temporal)   Resp 18   Ht 149.9 cm (59\")   Wt 64 kg (141 lb)   SpO2 99%   BMI 28.48 kg/m²   Current Outpatient Medications on File Prior to Visit   Medication Sig Dispense Refill    amLODIPine (NORVASC) 5 MG tablet TAKE 1 TABLET BY MOUTH DAILY 90 tablet 0    ascorbic acid (VITAMIN C) 1000 MG tablet Take 1 tablet by mouth Daily.      B Complex Vitamins " (VITAMIN B COMPLEX) capsule capsule Take 1 capsule by mouth Daily.      Calcium-Magnesium-Zinc 167-83-8 MG tablet Take 1 tablet by mouth Daily.      cholecalciferol (VITAMIN D3) 25 MCG (1000 UT) tablet Take 1 tablet by mouth Daily.      Coenzyme Q10 (Co Q10) 100 MG capsule       diphenhydrAMINE-acetaminophen (TYLENOL PM)  MG tablet per tablet Take 1 tablet by mouth At Night As Needed for Sleep.      Garlic 1000 MG capsule Take 1 capsule by mouth Daily.      hydrALAZINE (APRESOLINE) 10 MG tablet TAKE 1 TABLET BY MOUTH THREE TIMES DAILY 270 tablet 1    magnesium, as, gluconate (MAGONATE) 500 (27 Mg) MG tablet Take 1 tablet by mouth Daily.      Omega 3-6-9 Fatty Acids (TRIPLE OMEGA-3-6-9 PO) Take 1 capsule by mouth Daily.      vitamin A 50692 UNIT capsule Take 1 capsule by mouth Daily.      alendronate (Fosamax) 70 MG tablet Take 1 tablet by mouth Every 7 (Seven) Days. (Patient not taking: Reported on 2025) 12 tablet 1    [DISCONTINUED] busPIRone (BUSPAR) 5 MG tablet Take 1 tablet by mouth 3 (Three) Times a Day As Needed (anxiety). (Patient not taking: Reported on 2025) 30 tablet 2    [DISCONTINUED] magnesium oxide (MAG-OX) 400 MG tablet Take 1 tablet by mouth Daily. (Patient not taking: Reported on 2025)       No current facility-administered medications on file prior to visit.        Past Medical History:   Diagnosis Date    Arrhythmia     Arthritis     osteoarthritis    Chest pain     Diverticulosis     Enlarged thyroid     H/O varicose veins     phlebitis    Heart murmur     Heart valve disease     Hyperlipidemia     Hypertension     Insomnia     Irregular heart beat     Mitral valve prolapse     Palpitations     Scoliosis     Vitamin D deficiency       Past Surgical History:   Procedure Laterality Date    COLONOSCOPY      CYST REMOVAL Left     breast  //  benign      Family History   Problem Relation Age of Onset    Thyroid disease Mother             Hypertension Mother           at 95yo    Heart failure Mother         Heart failed    Cancer Father         lung ()    Thyroid disease Sister     Liver cancer Sister     Kidney disease Sister         Autoimmune    Cancer Sister         Liver ()    Thyroid disease Sister         taking meds    Cancer Sister         Breast(Living)    Diabetes Maternal Aunt             Diabetes Maternal Uncle     Sudden death Maternal Uncle     Hypertension Maternal Uncle         (stroke)    Thyroid disease Other         goiter, removed    Hyperlipidemia Maternal Grandfather     Cancer Sister         Breast ( On meds)    Hypertension Maternal Uncle         (stroke)    Kidney disease Sister         Auto Immune    Kidney disease Brother          (Acute Glomerulonephritis)    Sleep apnea Neg Hx       Social History     Socioeconomic History    Marital status:    Tobacco Use    Smoking status: Never    Smokeless tobacco: Never    Tobacco comments:     never a smoker   Vaping Use    Vaping status: Never Used   Substance and Sexual Activity    Alcohol use: No     Comment: caffeine use    Drug use: No    Sexual activity: Not Currently     Birth control/protection: Abstinence         Lab on 04/15/2025   Component Date Value Ref Range Status    Glucose 04/15/2025 84  65 - 99 mg/dL Final    BUN 04/15/2025 15  8 - 23 mg/dL Final    Creatinine 04/15/2025 0.78  0.57 - 1.00 mg/dL Final    Sodium 04/15/2025 141  136 - 145 mmol/L Final    Potassium 04/15/2025 4.1  3.5 - 5.2 mmol/L Final    Chloride 04/15/2025 104  98 - 107 mmol/L Final    CO2 04/15/2025 26.8  22.0 - 29.0 mmol/L Final    Calcium 04/15/2025 9.6  8.6 - 10.5 mg/dL Final    Albumin 04/15/2025 4.2  3.5 - 5.2 g/dL Final    Phosphorus 04/15/2025 4.1  2.5 - 4.5 mg/dL Final    Anion Gap 04/15/2025 10.2  5.0 - 15.0 mmol/L Final    BUN/Creatinine Ratio 04/15/2025 19.2  7.0 - 25.0 Final    eGFR 04/15/2025 84.4  >60.0 mL/min/1.73 Final         Physical Exam  Vitals  and nursing note reviewed.   Constitutional:       Appearance: Normal appearance. She is normal weight.   HENT:      Head: Normocephalic and atraumatic.   Cardiovascular:      Rate and Rhythm: Normal rate and regular rhythm.      Pulses: Normal pulses.      Heart sounds: Normal heart sounds. No murmur heard.     No friction rub. No gallop.   Pulmonary:      Effort: Pulmonary effort is normal. No respiratory distress.      Breath sounds: Normal breath sounds. No stridor. No wheezing, rhonchi or rales.   Chest:      Chest wall: No tenderness.   Abdominal:      General: Abdomen is flat. Bowel sounds are normal. There is no distension.      Palpations: Abdomen is soft. There is no mass.      Tenderness: There is no abdominal tenderness. There is no right CVA tenderness, left CVA tenderness, guarding or rebound.      Hernia: No hernia is present.   Skin:     General: Skin is warm and dry.      Capillary Refill: Capillary refill takes less than 2 seconds.      Coloration: Skin is not jaundiced or pale.   Neurological:      General: No focal deficit present.      Mental Status: She is alert and oriented to person, place, and time. Mental status is at baseline.      Motor: No weakness.      Coordination: Coordination normal.      Gait: Gait normal.   Psychiatric:         Mood and Affect: Mood normal.         Behavior: Behavior normal.         Thought Content: Thought content normal.         Judgment: Judgment normal.          Physical Exam         Result Review  Data Reviewed:{ Labs  Result Review  Imaging  Med Tab  Media :23}   I have reviewed this patient's chart.  I have reviewed previous labs, previous imaging, previous medications, and previous encounters with notes that were available in this patient's chart.    Results                Assessment and Plan {CC Problem List  Visit Diagnosis  ROS  Review (Popup)  [facility] Maintenance  Quality  BestPractice  Medications  SmartSets  SnapShot Encounters  Media  :23}   Diagnoses and all orders for this visit:    1. Primary hypertension (Primary)  -     Comprehensive metabolic panel; Future    2. Prediabetes  -     Hemoglobin A1c; Future    3. Encounter for screening mammogram for malignant neoplasm of breast  -     Mammo Screening Digital Tomosynthesis Bilateral With CAD; Future    4. Mixed hyperlipidemia  -     Lipid Panel; Future    5. Screening for thyroid disorder  -     TSH; Future    6. Renal cyst        Assessment & Plan  1. Hypertension.  - Continues to take amlodipine and occasionally hydralazine as needed.  - Blood pressure monitoring advised to ensure control.  - No new physical exam findings or test results discussed.  - Current medication regimen to be maintained.    2. Hyperlipidemia.  - Continues to take fish oil and CoQ10 supplements.  - Advised to maintain a low-fat diet.  - Lipid panel to be ordered to monitor cholesterol levels.  - No new physical exam findings or test results discussed.    3. Thyroid nodule.  - Family history of thyroid issues noted.  - Previous thyroid nodule aspiration mentioned.  - Thyroid function test to be ordered to check for abnormalities.  - No new physical exam findings or test results discussed.    4. Health Maintenance.  - M[facility] scheduled at [facility] for [facility].  - No new physical exam findings or test results discussed.  - No new medications or therapies prescribed.  - No new referrals ordered.       -  -ER red flags discussed with patient including risk versus benefit and education provided.  -Follow-up with me in 3 months or sooner if needed.    I spent 30 minutes caring for Tiny on this date of service. This time includes time spent by me in the following activities:preparing for the visit, reviewing tests, obtaining and/or reviewing a separately obtained history, performing a medically appropriate examination and/or evaluation , counseling and educating the patient/family/caregiver, ordering  medications, tests, or procedures, referring and communicating with other health care professionals , documenting information in the medical record, independently interpreting results and communicating that information with the patient/family/caregiver, and care coordination    Follow Up {Instructions Charge Capture  Follow-up Communications :23}     Patient was given instructions and counseling regarding her condition or for health maintenance advice. Please see specific information pulled into the AVS (placed there by myself) if appropriate.    Return in about 3 months (around 7/30/2025) for routine follow up.    BMI is >= 25 and <30. (Overweight) The following options were offered after discussion;: exercise counseling/recommendations and nutrition counseling/recommendations         ANTHONY Baird, [facility]      Patient or patient representative verbalized consent for the use of Ambient Listening during the visit with  ANTHONY Baird for chart documentation. 4/30/2025  13:44 EDT

## 2025-05-12 DIAGNOSIS — Z12.31 ENCOUNTER FOR SCREENING MAMMOGRAM FOR MALIGNANT NEOPLASM OF BREAST: ICD-10-CM

## 2025-05-22 ENCOUNTER — TELEPHONE (OUTPATIENT)
Dept: FAMILY MEDICINE CLINIC | Facility: CLINIC | Age: 65
End: 2025-05-22
Payer: MEDICARE

## 2025-05-22 DIAGNOSIS — R92.8 ABNORMAL MAMMOGRAM: ICD-10-CM

## 2025-05-22 DIAGNOSIS — N64.89 BREAST ASYMMETRY: ICD-10-CM

## 2025-05-22 NOTE — TELEPHONE ENCOUNTER
[facility] called and they are needing an order for a breast biopsy of right breast if we would like her to have it done there.        Please fax order to [phone]        Ashlyn Rodriguez MA  05/22/25, 16:15 EDT

## 2025-06-09 DIAGNOSIS — N63.10 MASS OF RIGHT BREAST, UNSPECIFIED QUADRANT: ICD-10-CM

## 2025-06-09 DIAGNOSIS — R92.8 OTHER ABNORMAL AND INCONCLUSIVE FINDINGS ON DIAGNOSTIC IMAGING OF BREAST: ICD-10-CM

## 2025-06-12 ENCOUNTER — TELEPHONE (OUTPATIENT)
Dept: FAMILY MEDICINE CLINIC | Facility: CLINIC | Age: 65
End: 2025-06-12

## 2025-06-12 NOTE — TELEPHONE ENCOUNTER
Caller: Tiny Avina    Relationship: Self    Best call back number: [phone]     Caller requesting test results: TINY    What test was performed: BIOPSY    When was the test performed: 6/9/25    Where was the test performed: [facility]    Additional notes: PATIENT WOULD LIKE TO DISCUSS NEXT STEPS.

## 2025-06-13 ENCOUNTER — TELEPHONE (OUTPATIENT)
Dept: FAMILY MEDICINE CLINIC | Facility: CLINIC | Age: 65
End: 2025-06-13
Payer: MEDICARE

## 2025-06-13 NOTE — TELEPHONE ENCOUNTER
Pt calling on breast biopsy path she has seen on my chart and has questions has called x2 days has appt in [place] next week 06/18/256 for onc/radiation

## 2025-07-24 RX ORDER — ZOSTER VACCINE RECOMBINANT, ADJUVANTED 50 MCG/0.5
0.5 KIT INTRAMUSCULAR ONCE
Qty: 1 EACH | Refills: 0 | Status: CANCELLED | OUTPATIENT
Start: 2025-07-24 | End: 2025-07-24

## 2025-07-25 ENCOUNTER — TELEMEDICINE (OUTPATIENT)
Dept: FAMILY MEDICINE CLINIC | Facility: CLINIC | Age: 65
End: 2025-07-25
Payer: MEDICARE

## 2025-07-25 DIAGNOSIS — Z01.818 PREOPERATIVE CLEARANCE: Primary | ICD-10-CM

## 2025-07-25 PROCEDURE — 1126F AMNT PAIN NOTED NONE PRSNT: CPT | Performed by: REGISTERED NURSE

## 2025-07-25 PROCEDURE — 99213 OFFICE O/P EST LOW 20 MIN: CPT | Performed by: REGISTERED NURSE

## 2025-07-25 PROCEDURE — 1159F MED LIST DOCD IN RCRD: CPT | Performed by: REGISTERED NURSE

## 2025-07-25 PROCEDURE — 1160F RVW MEDS BY RX/DR IN RCRD: CPT | Performed by: REGISTERED NURSE

## 2025-08-12 DIAGNOSIS — I10 PRIMARY HYPERTENSION: ICD-10-CM

## 2025-08-12 RX ORDER — AMLODIPINE BESYLATE 5 MG/1
5 TABLET ORAL DAILY
Qty: 90 TABLET | Refills: 0 | Status: SHIPPED | OUTPATIENT
Start: 2025-08-12